# Patient Record
Sex: FEMALE | Race: WHITE | NOT HISPANIC OR LATINO | Employment: OTHER | URBAN - NONMETROPOLITAN AREA
[De-identification: names, ages, dates, MRNs, and addresses within clinical notes are randomized per-mention and may not be internally consistent; named-entity substitution may affect disease eponyms.]

---

## 2017-03-16 ENCOUNTER — TRANSFERRED RECORDS (OUTPATIENT)
Dept: HEALTH INFORMATION MANAGEMENT | Facility: HOSPITAL | Age: 37
End: 2017-03-16

## 2017-03-16 LAB
ABO + RH BLD: NORMAL
ABO + RH BLD: NORMAL
BLD GP AB SCN SERPL QL: NEGATIVE
CULTURE MICRO: NEGATIVE
HBV SURFACE AG SERPL QL IA: NEGATIVE
HCT VFR BLD AUTO: 41 %
HEMOGLOBIN: 13.6 G/DL (ref 11.7–15.7)
PLATELET # BLD AUTO: 207 10^9/L
RUBELLA ABY IGG: 18.3
RUBELLA ANTIBODY IGG QUANTITATIVE: NEGATIVE IU/ML
T PALLIDUM IGG SER QL: NON REACTIVE

## 2017-05-12 ENCOUNTER — TRANSFERRED RECORDS (OUTPATIENT)
Dept: HEALTH INFORMATION MANAGEMENT | Facility: HOSPITAL | Age: 37
End: 2017-05-12

## 2017-05-23 ENCOUNTER — TRANSFERRED RECORDS (OUTPATIENT)
Dept: HEALTH INFORMATION MANAGEMENT | Facility: HOSPITAL | Age: 37
End: 2017-05-23

## 2017-05-24 ENCOUNTER — TRANSFERRED RECORDS (OUTPATIENT)
Dept: HEALTH INFORMATION MANAGEMENT | Facility: HOSPITAL | Age: 37
End: 2017-05-24

## 2017-06-01 ENCOUNTER — VIRTUAL VISIT (OUTPATIENT)
Dept: OBGYN | Facility: OTHER | Age: 37
End: 2017-06-01

## 2017-06-01 DIAGNOSIS — Z53.9 ERRONEOUS ENCOUNTER--DISREGARD: Primary | ICD-10-CM

## 2017-06-20 ENCOUNTER — PRENATAL OFFICE VISIT (OUTPATIENT)
Dept: OBGYN | Facility: OTHER | Age: 37
End: 2017-06-20
Attending: ADVANCED PRACTICE MIDWIFE
Payer: COMMERCIAL

## 2017-06-20 VITALS
BODY MASS INDEX: 23.99 KG/M2 | HEIGHT: 65 IN | WEIGHT: 144 LBS | HEART RATE: 79 BPM | DIASTOLIC BLOOD PRESSURE: 66 MMHG | SYSTOLIC BLOOD PRESSURE: 104 MMHG | OXYGEN SATURATION: 99 %

## 2017-06-20 DIAGNOSIS — Z34.80 SUPERVISION OF OTHER NORMAL PREGNANCY, ANTEPARTUM: ICD-10-CM

## 2017-06-20 PROCEDURE — 99207 ZZC FIRST OB VISIT: CPT | Performed by: ADVANCED PRACTICE MIDWIFE

## 2017-06-20 ASSESSMENT — ANXIETY QUESTIONNAIRES
7. FEELING AFRAID AS IF SOMETHING AWFUL MIGHT HAPPEN: NOT AT ALL
IF YOU CHECKED OFF ANY PROBLEMS ON THIS QUESTIONNAIRE, HOW DIFFICULT HAVE THESE PROBLEMS MADE IT FOR YOU TO DO YOUR WORK, TAKE CARE OF THINGS AT HOME, OR GET ALONG WITH OTHER PEOPLE: NOT DIFFICULT AT ALL
2. NOT BEING ABLE TO STOP OR CONTROL WORRYING: NOT AT ALL
4. TROUBLE RELAXING: NOT AT ALL
3. WORRYING TOO MUCH ABOUT DIFFERENT THINGS: NOT AT ALL
5. BEING SO RESTLESS THAT IT IS HARD TO SIT STILL: NOT AT ALL
6. BECOMING EASILY ANNOYED OR IRRITABLE: SEVERAL DAYS
GAD7 TOTAL SCORE: 1
1. FEELING NERVOUS, ANXIOUS, OR ON EDGE: NOT AT ALL

## 2017-06-20 ASSESSMENT — PAIN SCALES - GENERAL: PAINLEVEL: NO PAIN (0)

## 2017-06-20 NOTE — MR AVS SNAPSHOT
"              After Visit Summary   6/20/2017    Diana Chavis    MRN: [id number]           Patient Information     Date Of Birth          1980        Visit Information        Provider Department      6/20/2017 10:30 AM Marcelo Roberts APRN CNM [facility]        Today's Diagnoses     Supervision of other normal pregnancy, antepartum          Care Instructions    Return in 3 weeks for prenatal care and labs.          Follow-ups after your visit        Your next 10 appointments already scheduled     Jul 10, 2017 10:30 AM CDT   (Arrive by 10:15 AM)   ESTABLISHED PRENATAL with MARYANN Francisco CNM   [facility] ([facility] )    [address]  [facility] [address]   [phone]              Who to contact     If you have questions or need follow up information about today's clinic visit or your schedule please contact [facility] directly at [phone].  Normal or non-critical lab and imaging results will be communicated to you by MyChart, letter or phone within 4 business days after the clinic has received the results. If you do not hear from us within 7 days, please contact the clinic through [employer] or phone. If you have a critical or abnormal lab result, we will notify you by phone as soon as possible.  Submit refill requests through [employer] or call your pharmacy and they will forward the refill request to us. Please allow 3 business days for your refill to be completed.          Additional Information About Your Visit        [employer] Information     [employer] lets you send messages to your doctor, view your test results, renew your prescriptions, schedule appointments and more. To sign up, go to [email or web address] . Click on \"Log in\" on the left side of the screen, which will take you to the Welcome page. Then click on \"Sign up Now\" on the right side of the page.     You will be asked to enter the access code listed below, as well as some " "personal information. Please follow the directions to create your username and password.     Your access code is: [email or web address]  Expires: 2017  4:28 PM     Your access code will  in 90 days. If you need help or a new code, please call your [facility] or [phone].        Care EveryWhere ID     This is your Care EveryWhere ID. This could be used by other organizations to access your [place] medical records  [id number]        Your Vitals Were     Pulse Height Last Period Pulse Oximetry BMI (Body Mass Index)       79 5' 5\" (1.651 m) 2016 (Exact Date) 99% 23.96 kg/m2        Blood Pressure from Last 3 Encounters:   17 104/66    Weight from Last 3 Encounters:   17 144 lb (65.3 kg)              We Performed the Following     ABO and Rh     Anti Treponema     Antibody screen red cell     CBC with platelets     CBC with platelets     Hepatitis B surface antigen     OB hemoglobin     Rubella Antibody IgG Quantitative     Urine Culture Aerobic Bacterial        Primary Care Provider    None Specified       No primary provider on file.        Thank you!     Thank you for choosing [facility]  for your care. Our goal is always to provide you with excellent care. Hearing back from our patients is one way we can continue to improve our services. Please take a few minutes to complete the written survey that you may receive in the mail after your visit with us. Thank you!             Your Updated Medication List - Protect others around you: Learn how to safely use, store and throw away your medicines at www.disposemymeds.org.          This list is accurate as of: 17  4:32 PM.  Always use your most recent med list.                   Brand Name Dispense Instructions for use    FISH OIL OMEGA-3 PO          PRENATAL VITAMINS PO      Take 1 tablet by mouth daily         "

## 2017-06-20 NOTE — PROGRESS NOTES
NEW OB VISIT  Diana Chavis is a 36 year old  at 24w4d presenting for a new ob visit.      Currently taking PNV? y  Folate y    ZIKA no    MEDICAL HISTORY:  Diabetes: No  Hypertension: No  Heart Disease: No  Autoimmune disorder: No  Kidney Disease/UTI: No  Neurologic Disease/Epilepsy:No  Psychiatric Disease: No  Depression/Postpartum Depression:No  Varicositites/Phlebitis: No  Hepatitis/Liver Disease: no  Thyroid Dysfunction: No  Trauma/Violence: No  History of Blood Transfusion: No  Tobacco Use: No  Alcohol Use: No  Illicit/Recreational Drugs: No  D (Rh Sensitized): No  Pulmonary Disease (TB/Asthma): No  Drug/Latex Allergies/Reactions: No  Breast: No  GYN Surgery:No  Operations/Hospitalizations:Yes, wisdom teeth extracted   Anesthetic Complications: No  History of Abnormal Pap:No  Uterine Anomalies/SANA: No  Infertility: No  ART Treatment: No  Relevant Family History:No  Other/Comments: No    INFECTION HISTORY:  Are you exposed to TB anywhere you work or live?: n  Do you or your Partner have Genital Herpes: n  Rash or viral illness or fever since LMP: n  Hepatitis B or C: n  History of STI (Gonorrhea, Chlamydia, HPV, HIV, Syphilis): n  Other: n  Cats n    BABY DOC unsure             Breast feeding: y        IMMUNIZATION HISTORY:  Chicken Pox: y  Flu Vaccine:  n  Pneumococcal if smoker or RAD:  n  Tdap: At 27 weeks gestation  HPV vaccinations (Gardasil): n  Other/comments: n    FAMILY HISTORY  Diabetes: n  Hypertension: dad  CVA/Stroke: n  Lupus: n  Cancers: Breast  n ovarian n,colon n,uterine: n           Genetics Screening/Teratology Counseling:  Includes Patient, Baby's Father, or anyone in either family with:  Patient's age 35 years or older as of estimated date of delivery:  y  Thalassemia: MCV less than 80: n  Neural Tube defects: n  Congenital Heart Defects: n  Down syndrome: FOB uncle  Grover-Sachs: n  Canavan Disease: n  Familial Dysautonomia: n  Sickle Cell Disease or Trait: n  Hemophilia or other  "blood disorders: n  Muscular Dystrophy: n  Cystic Fibrosis: n  Brigette's Chorea: n  Mental Retardation or Autism: Mom has 2 cousins with learning disabilities/FOB has one counsin  Other genetic or chromosomal disorders: n  Maternal Metabolic Disorder (Type 1 DM, PKU): n  Patient or baby's father with birth defects not listed above: n  Recurrent pregnancy loss or stillbirth: n  Medications (Supplements, drugs)/ Illicit/ Recreational drugs/ Alcohol since LMP: n  Other/Comments: n    Review Of Systems:   C:     NEGATIVE for fever, chills, change in weight  I:       NEGATIVE for worrisome rashes, moles or lesions  E:     NEGATIVE for vision changes or irritation  E/M: NEGATIVE for ear, mouth and throat problems  R:     NEGATIVE for significant cough or SOB  CV:   NEGATIVE for chest pain, palpitations or peripheral edema  GI:     NEGATIVE for unusual nausea, abdominal pain, heartburn, or change in bowel   :   NEGATIVE for frequency, dysuria, hematuria, vaginal discharge or bleeding  M:     NEGATIVE for significant arthralgias or myalgia  N:      NEGATIVE for weakness, dizziness or paresthesias  E:      NEGATIVE for temperature intolerance, skin/hair changes  P:      NEGATIVE for changes in mood or affect.     PHYSICAL EXAM:   /66 (BP Location: Left arm, Patient Position: Chair, Cuff Size: Adult Regular)  Pulse 79  Ht 5' 5\" (1.651 m)  Wt 144 lb (65.3 kg)  LMP 12/26/2016 (Exact Date)  SpO2 99%  BMI 23.96 kg/m2   BMI: Body mass index is 23.96 kg/(m^2).  Constitutional: healthy, alert and no distress  Head: Normocephalic. No masses, lesions, tenderness or abnormalities  Neck: Neck supple. Trachea midline. No adenopathy. Thyroid symmetric, normal size.   Cardiovascular: RRR.   Respiratory: lungs clear   Breast: Breasts reveal mild symmetric fibrocystic densities, but there are no dominant, discrete, fixed or suspicious masses found.  Gastrointestinal: 24 week gestation  Pelvic:  deferred  Rectal Exam: " deferred  Musculoskeletal: extremities normal  Skin: no suspicious lesions or rashes  Psychiatric: Affect appropriate, cooperative,mentation appears normal.     Risk assessment done. Level is   low    ASSESSMENT:     IUP at 24w 4d  Age 37 at TREVA/May be candidate for free-cell DNA  Hx of hypno-birthing    PLAN:  Prenatal labs completed  11-13 weeks 1st trimester NT/Bloodwork/declined  15-16 wk MSAFP/declined  Discussed free-cell DNA with age being 37 years at delivery  AnatomyUltrasound at 20 weeks/done  Tdap at 27 weeks  EFW at 38 weeks prn     Flu shot no  RTO return in 3 weeks    Greater than 30 were spent in face to face counseling and interview by me for this initial new ob visit.  Marcelo WOLF, ABRAHANM

## 2017-06-20 NOTE — NURSING NOTE
"Chief Complaint   Patient presents with     Prenatal Care     24w4d       Initial /66 (BP Location: Left arm, Patient Position: Chair, Cuff Size: Adult Regular)  Pulse 79  Ht 5' 5\" (1.651 m)  Wt 144 lb (65.3 kg)  LMP 12/26/2016 (Exact Date)  SpO2 99%  BMI 23.96 kg/m2 Estimated body mass index is 23.96 kg/(m^2) as calculated from the following:    Height as of this encounter: 5' 5\" (1.651 m).    Weight as of this encounter: 144 lb (65.3 kg).  Medication Reconciliation: mandy Lloyd      "

## 2017-06-21 ASSESSMENT — PATIENT HEALTH QUESTIONNAIRE - PHQ9: SUM OF ALL RESPONSES TO PHQ QUESTIONS 1-9: 4

## 2017-06-21 ASSESSMENT — ANXIETY QUESTIONNAIRES: GAD7 TOTAL SCORE: 1

## 2017-07-12 ENCOUNTER — PRENATAL OFFICE VISIT (OUTPATIENT)
Dept: OBGYN | Facility: OTHER | Age: 37
End: 2017-07-12
Attending: ADVANCED PRACTICE MIDWIFE
Payer: COMMERCIAL

## 2017-07-12 VITALS
WEIGHT: 152 LBS | BODY MASS INDEX: 26.93 KG/M2 | HEART RATE: 68 BPM | OXYGEN SATURATION: 100 % | DIASTOLIC BLOOD PRESSURE: 62 MMHG | HEIGHT: 63 IN | SYSTOLIC BLOOD PRESSURE: 100 MMHG

## 2017-07-12 DIAGNOSIS — Z34.80 SUPERVISION OF OTHER NORMAL PREGNANCY, ANTEPARTUM: ICD-10-CM

## 2017-07-12 DIAGNOSIS — Z34.82 ENCOUNTER FOR SUPERVISION OF OTHER NORMAL PREGNANCY, SECOND TRIMESTER: Primary | ICD-10-CM

## 2017-07-12 DIAGNOSIS — O99.810 GLUCOSE INTOLERANCE OF PREGNANCY: ICD-10-CM

## 2017-07-12 DIAGNOSIS — Z34.80 SUPERVISION OF OTHER NORMAL PREGNANCY, ANTEPARTUM: Primary | ICD-10-CM

## 2017-07-12 DIAGNOSIS — Z23 NEED FOR TDAP VACCINATION: ICD-10-CM

## 2017-07-12 DIAGNOSIS — O99.810 GLUCOSE INTOLERANCE OF PREGNANCY: Primary | ICD-10-CM

## 2017-07-12 LAB
ALBUMIN UR-MCNC: NEGATIVE MG/DL
APPEARANCE UR: CLEAR
BACTERIA #/AREA URNS HPF: ABNORMAL /HPF
BILIRUB UR QL STRIP: NEGATIVE
COLOR UR AUTO: YELLOW
ERYTHROCYTE [DISTWIDTH] IN BLOOD BY AUTOMATED COUNT: 13.3 % (ref 10–15)
GLUCOSE 1H P 50 G GLC PO SERPL-MCNC: 161 MG/DL (ref 60–129)
GLUCOSE UR STRIP-MCNC: NEGATIVE MG/DL
HCT VFR BLD AUTO: 34.3 % (ref 35–47)
HGB BLD-MCNC: 11.9 G/DL (ref 11.7–15.7)
HGB UR QL STRIP: NEGATIVE
KETONES UR STRIP-MCNC: NEGATIVE MG/DL
LEUKOCYTE ESTERASE UR QL STRIP: ABNORMAL
MCH RBC QN AUTO: 32.7 PG (ref 26.5–33)
MCHC RBC AUTO-ENTMCNC: 34.7 G/DL (ref 31.5–36.5)
MCV RBC AUTO: 94 FL (ref 78–100)
MUCOUS THREADS #/AREA URNS LPF: PRESENT /LPF
NITRATE UR QL: NEGATIVE
PH UR STRIP: 7 PH (ref 4.7–8)
PLATELET # BLD AUTO: 200 10E9/L (ref 150–450)
RBC # BLD AUTO: 3.64 10E12/L (ref 3.8–5.2)
RBC #/AREA URNS AUTO: 0 /HPF (ref 0–2)
SP GR UR STRIP: 1.01 (ref 1–1.03)
SQUAMOUS #/AREA URNS AUTO: 2 /HPF (ref 0–1)
URN SPEC COLLECT METH UR: ABNORMAL
UROBILINOGEN UR STRIP-MCNC: NORMAL MG/DL (ref 0–2)
WBC # BLD AUTO: 8.7 10E9/L (ref 4–11)
WBC #/AREA URNS AUTO: 1 /HPF (ref 0–2)

## 2017-07-12 PROCEDURE — 82950 GLUCOSE TEST: CPT | Performed by: ADVANCED PRACTICE MIDWIFE

## 2017-07-12 PROCEDURE — 99000 SPECIMEN HANDLING OFFICE-LAB: CPT | Performed by: ADVANCED PRACTICE MIDWIFE

## 2017-07-12 PROCEDURE — 90715 TDAP VACCINE 7 YRS/> IM: CPT | Performed by: ADVANCED PRACTICE MIDWIFE

## 2017-07-12 PROCEDURE — 85027 COMPLETE CBC AUTOMATED: CPT | Performed by: ADVANCED PRACTICE MIDWIFE

## 2017-07-12 PROCEDURE — 81001 URINALYSIS AUTO W/SCOPE: CPT | Performed by: ADVANCED PRACTICE MIDWIFE

## 2017-07-12 PROCEDURE — 86780 TREPONEMA PALLIDUM: CPT | Mod: 90 | Performed by: ADVANCED PRACTICE MIDWIFE

## 2017-07-12 PROCEDURE — 90471 IMMUNIZATION ADMIN: CPT | Performed by: ADVANCED PRACTICE MIDWIFE

## 2017-07-12 PROCEDURE — 36415 COLL VENOUS BLD VENIPUNCTURE: CPT | Performed by: ADVANCED PRACTICE MIDWIFE

## 2017-07-12 PROCEDURE — 99207 ZZC PRENATAL VISIT: CPT | Performed by: ADVANCED PRACTICE MIDWIFE

## 2017-07-12 ASSESSMENT — PAIN SCALES - GENERAL: PAINLEVEL: NO PAIN (0)

## 2017-07-12 NOTE — PROGRESS NOTES
Doing well.  Baby active.  Denies contractions, bleeding or LOF.  Discussed PTL, PIH, and birth plan, and Pitocin after delivery prophylactic for PPH.  Birth place tour given.  28 week labs today with one-hour GTT  RTO 2 weeks.

## 2017-07-12 NOTE — MR AVS SNAPSHOT
"              After Visit Summary   7/12/2017    Diana Chavis    MRN: [id number]           Patient Information     Date Of Birth          1980        Visit Information        Provider Department      7/12/2017 11:30 AM Marcelo Roberts APRN [facility]        Today's Diagnoses     Encounter for supervision of other normal pregnancy, second trimester    -  1    Supervision of other normal pregnancy, antepartum        Need for Tdap vaccination          Care Instructions    Return to office in 2 weeks for prenatal care and as needed.          Follow-ups after your visit        Who to contact     If you have questions or need follow up information about today's clinic visit or your schedule please contact [facility] directly at [phone].  Normal or non-critical lab and imaging results will be communicated to you by [employer], letter or phone within 4 business days after the clinic has received the results. If you do not hear from us within 7 days, please contact the clinic through [employer] or phone. If you have a critical or abnormal lab result, we will notify you by phone as soon as possible.  Submit refill requests through [employer] or call your pharmacy and they will forward the refill request to us. Please allow 3 business days for your refill to be completed.          Additional Information About Your Visit        MyChart Information     [employer] lets you send messages to your doctor, view your test results, renew your prescriptions, schedule appointments and more. To sign up, go to [email or web address] . Click on \"Log in\" on the left side of the screen, which will take you to the Welcome page. Then click on \"Sign up Now\" on the right side of the page.     You will be asked to enter the access code listed below, as well as some personal information. Please follow the directions to create your username and password.     Your access code is: [email or web address]  Expires: 9/11/2017  " "4:28 PM     Your access code will  in 90 days. If you need help or a new code, please call your [place] clinic or [phone].        Care EveryWhere ID     This is your Care EveryWhere ID. This could be used by other organizations to access your [place] medical records  [id number]        Your Vitals Were     Pulse Height Last Period Pulse Oximetry BMI (Body Mass Index)       68 5' 3\" (1.6 m) 2016 (Exact Date) 100% 26.93 kg/m2        Blood Pressure from Last 3 Encounters:   17 100/62   17 104/66    Weight from Last 3 Encounters:   17 152 lb (68.9 kg)   17 144 lb (65.3 kg)              We Performed the Following     TDAP VACCINE (ADACEL)        Primary Care Provider    None Specified       No primary provider on file.        Equal Access to Services     [facility]: Hadii jame Shell, kevin newberry, jayda kaalmamartina solorzano, lauren rees . So [facility] [phone].    ATENCIÓN: Si habla español, tiene a cuellar disposición servicios gratuitos de asistencia lingüística. Llame al [phone].    We comply with applicable federal civil rights laws and [place] laws. We do not discriminate on the basis of race, color, national origin, age, disability sex, sexual orientation or gender identity.            Thank you!     Thank you for choosing [facility]  for your care. Our goal is always to provide you with excellent care. Hearing back from our patients is one way we can continue to improve our services. Please take a few minutes to complete the written survey that you may receive in the mail after your visit with us. Thank you!             Your Updated Medication List - Protect others around you: Learn how to safely use, store and throw away your medicines at www.disposemymeds.org.          This list is accurate as of: 17  5:05 PM.  Always use your most recent med list.                   Brand Name Dispense Instructions for " use Diagnosis    FISH OIL OMEGA-3 PO           PRENATAL VITAMINS PO      Take 1 tablet by mouth daily

## 2017-07-12 NOTE — NURSING NOTE
"Chief Complaint   Patient presents with     Prenatal Care     27w5d       Initial /62 (BP Location: Left arm, Patient Position: Chair, Cuff Size: Adult Regular)  Pulse 68  Ht 5' 3\" (1.6 m)  Wt 152 lb (68.9 kg)  LMP 12/26/2016 (Exact Date)  SpO2 100%  BMI 26.93 kg/m2 Estimated body mass index is 26.93 kg/(m^2) as calculated from the following:    Height as of this encounter: 5' 3\" (1.6 m).    Weight as of this encounter: 152 lb (68.9 kg).  Medication Reconciliation: mandy Lloyd      "

## 2017-07-13 PROBLEM — O99.810 GLUCOSE INTOLERANCE OF PREGNANCY: Status: ACTIVE | Noted: 2017-07-13

## 2017-07-14 LAB — T PALLIDUM IGG+IGM SER QL: NEGATIVE

## 2017-07-24 ENCOUNTER — HOSPITAL ENCOUNTER (OUTPATIENT)
Dept: EDUCATION SERVICES | Facility: HOSPITAL | Age: 37
Discharge: HOME | End: 2017-07-24
Attending: ADVANCED PRACTICE MIDWIFE | Admitting: ADVANCED PRACTICE MIDWIFE
Payer: COMMERCIAL

## 2017-07-24 VITALS
HEIGHT: 63 IN | WEIGHT: 152.7 LBS | BODY MASS INDEX: 27.05 KG/M2 | SYSTOLIC BLOOD PRESSURE: 103 MMHG | HEART RATE: 66 BPM | OXYGEN SATURATION: 98 % | DIASTOLIC BLOOD PRESSURE: 62 MMHG

## 2017-07-24 DIAGNOSIS — Z34.82 ENCOUNTER FOR SUPERVISION OF OTHER NORMAL PREGNANCY, SECOND TRIMESTER: ICD-10-CM

## 2017-07-24 DIAGNOSIS — Z71.89 ACP (ADVANCE CARE PLANNING): Chronic | ICD-10-CM

## 2017-07-24 DIAGNOSIS — Z23 NEED FOR TDAP VACCINATION: ICD-10-CM

## 2017-07-24 DIAGNOSIS — O99.810 GLUCOSE INTOLERANCE OF PREGNANCY: ICD-10-CM

## 2017-07-24 DIAGNOSIS — Z34.80 SUPERVISION OF OTHER NORMAL PREGNANCY, ANTEPARTUM: ICD-10-CM

## 2017-07-24 LAB
EST. AVERAGE GLUCOSE BLD GHB EST-MCNC: 105 MG/DL
GLUCOSE 2H P 75 G GLC PO SERPL-MCNC: 100 MG/DL (ref 60–200)
GLUCOSE BLD-MCNC: 83 MG/DL (ref 70–99)
HBA1C MFR BLD: 5.3 % (ref 4.3–6)

## 2017-07-24 PROCEDURE — 82950 GLUCOSE TEST: CPT | Performed by: ADVANCED PRACTICE MIDWIFE

## 2017-07-24 PROCEDURE — 83036 HEMOGLOBIN GLYCOSYLATED A1C: CPT | Performed by: ADVANCED PRACTICE MIDWIFE

## 2017-07-24 PROCEDURE — 82947 ASSAY GLUCOSE BLOOD QUANT: CPT | Performed by: ADVANCED PRACTICE MIDWIFE

## 2017-07-24 PROCEDURE — 97802 MEDICAL NUTRITION INDIV IN: CPT | Performed by: DIETITIAN, REGISTERED

## 2017-07-24 ASSESSMENT — PAIN SCALES - GENERAL: PAINLEVEL: NO PAIN (0)

## 2017-07-24 NOTE — IP AVS SNAPSHOT
"                  MRN:[id number]                      After Visit Summary   7/24/2017    Diana Chavis    MRN: [id number]           Thank you!     Thank you for choosing [facility] for your care. Our goal is always to provide you with excellent care. Hearing back from our patients is one way we can continue to improve our services. Please take a few minutes to complete the written survey that you may receive in the mail after you visit with us. Thank you!        Patient Information     Date Of Birth          1980        About your hospital stay     You were admitted on:  July 24, 2017 You last received care in the:  [facility]    You were discharged on:  July 24, 2017       Who to Call     For medical emergencies, please call 911.  For non-urgent questions about your medical care, please call your primary care provider or clinic, None          Attending Provider     Provider Specialty    Marcelo Roberts APRN CNM [facility]       Primary Care Provider    None Specified      Further instructions from your care team       -Follow carbohydrate counting meal plan provided.    -Be as active as able/allowed.   -If two hour is elevated -  your meter and begin testing 4x/day.    -Record all glucose levels on sheet provided and bring with you to provider visits.    -Call if any three levels in one column are out of target.   -JS Andre, E [phone]    Pending Results     No orders found from 7/22/2017 to 7/25/2017.            Admission Information     Date & Time Provider Department Dept. Phone    7/24/2017 Marcelo Roberts APRN CNM [facility] [phone]      Your Vitals Were     Blood Pressure Pulse Height Weight Last Period Pulse Oximetry    103/62 (BP Location: Right arm, Patient Position: Chair, Cuff Size: Adult Large) 66 1.6 m (5' 3\") 69.3 kg (152 lb 11.2 oz) 12/26/2016 (Exact Date) 98%    BMI (Body Mass Index)                   27.05 kg/m2           MyChart Information     " "[employer] lets you send messages to your doctor, view your test results, renew your prescriptions, schedule appointments and more. To sign up, go to [email or web address] . Click on \"Log in\" on the left side of the screen, which will take you to the Welcome page. Then click on \"Sign up Now\" on the right side of the page.     You will be asked to enter the access code listed below, as well as some personal information. Please follow the directions to create your username and password.     Your access code is: [email or web address]  Expires: 2017  4:28 PM     Your access code will  in 90 days. If you need help or a new code, please call your [place] clinic or [phone].        Care EveryWhere ID     This is your Care EveryWhere ID. This could be used by other organizations to access your [place] medical records  [id number]        Equal Access to Services     [facility]: Hadkriss wayo Soestela, waaxda lusteven, qaybta kaalmada adesegundo, lauren rees . So [facility] [phone].    ATENCIÓN: Si hugh dsouza, tiene a cuellar disposición servicios gratuitos de asistencia lingüística. Llame al [phone].    We comply with applicable federal civil rights laws and [place] laws. We do not discriminate on the basis of race, color, national origin, age, disability sex, sexual orientation or gender identity.               Review of your medicines      UNREVIEWED medicines. Ask your doctor about these medicines        Dose / Directions    FISH OIL OMEGA-3 PO        Refills:  0       PRENATAL VITAMINS PO        Dose:  1 tablet   Take 1 tablet by mouth daily   Refills:  0                Protect others around you: Learn how to safely use, store and throw away your medicines at www.disposemymeds.org.             Medication List: This is a list of all your medications and when to take them. Check marks below indicate your daily home schedule. Keep this list as a reference.      Medications  "          Morning Afternoon Evening Bedtime As Needed    FISH OIL OMEGA-3 PO                                PRENATAL VITAMINS PO   Take 1 tablet by mouth daily

## 2017-07-24 NOTE — IP AVS SNAPSHOT
[facility]    [address] [place] [id number]    Phone:  [phone]    Fax:  [phone]                                       After Visit Summary   7/24/2017    Diana Chavis    MRN: [id number]           After Visit Summary Signature Page     I have received my discharge instructions, and my questions have been answered. I have discussed any challenges I see with this plan with the nurse or doctor.    ..........................................................................................................................................  Patient/Patient Representative Signature      ..........................................................................................................................................  Patient Representative Print Name and Relationship to Patient    ..................................................               ................................................  Date                                            Time    ..........................................................................................................................................  Reviewed by Signature/Title    ...................................................              ..............................................  Date                                                            Time

## 2017-07-24 NOTE — DISCHARGE INSTRUCTIONS
-Follow carbohydrate counting meal plan provided.    -Be as active as able/allowed.   -If two hour is elevated -  your meter and begin testing 4x/day.    -Record all glucose levels on sheet provided and bring with you to provider visits.    -Call if any three levels in one column are out of target.   -JS Andre, [phone]

## 2017-07-24 NOTE — PROGRESS NOTES
"Pt is here today with dx of glucose intolerance in pregnancy.  This is her 5th pregnancy.  She notes she failed her 1 hour with one of her pregnancies but passed  3 hour test.  None of her babies were greater than 9 pounds.      /62 (BP Location: Right arm, Patient Position: Chair, Cuff Size: Adult Large)  Pulse 66  Ht 1.6 m (5' 3\")  Wt 69.3 kg (152 lb 11.2 oz)  LMP 12/26/2016 (Exact Date)  SpO2 98%  BMI 27.05 kg/m2  Pt notes prepregnancy weight of 125#.      1 hour was 161.  Pt is in the process of 2 hour today.  Fasting is 83.  Waiting 2 hour.      Verbal diet recall obtained.  Pt eats three meals per day with occasional snacks between.  She has already been trying to limit carbohydrates in diet.    Breakfast- 2 eggs, 1-2 toast with butter, coffee  Snack-orange  Lunch-meat, vegetables, milk  Snack-toast or fruit or cheese stick or crackers  Supper-meat, vegis, milk  Snack-none    Pt is active caring for her five children but no routine exercise.      Educated pt on healthy diet for pregnancy, carbohydrate counting meal plan, carbohydrate limits, glycemic index/load,  portion control, label reading and benefits of activity as allowed.  Pt listened attentively and was able to grasp information easily.  Pt will not have to make many changes to current diet per diet recall.      Pt was educated on use of One Touch Verio Flex meter and will pick meter up if she does not pass two hour.  She has sheet to record glucose levels 4x/day and will bring to provider visits if indicated.  Supplies will need to be ordered from [facility] in [place].      PLAN:  Follow carbohydrate counting meal plan and begin testing if indicated by 2 hour results.      Follow up as needed.   "

## 2017-07-24 NOTE — NURSING NOTE
"Chief Complaint   Patient presents with     Diabetes     glucose intolerance of pregnancy       Initial /62 (BP Location: Right arm, Patient Position: Chair, Cuff Size: Adult Large)  Pulse 66  Ht 1.6 m (5' 3\")  Wt 69.3 kg (152 lb 11.2 oz)  LMP 12/26/2016 (Exact Date)  SpO2 98%  BMI 27.05 kg/m2 Estimated body mass index is 27.05 kg/(m^2) as calculated from the following:    Height as of this encounter: 1.6 m (5' 3\").    Weight as of this encounter: 69.3 kg (152 lb 11.2 oz).  Medication Reconciliation: complete   Tasha Taylor      "

## 2017-07-27 ENCOUNTER — PRENATAL OFFICE VISIT (OUTPATIENT)
Dept: OBGYN | Facility: OTHER | Age: 37
End: 2017-07-27
Attending: ADVANCED PRACTICE MIDWIFE
Payer: COMMERCIAL

## 2017-07-27 VITALS
HEIGHT: 63 IN | BODY MASS INDEX: 26.93 KG/M2 | HEART RATE: 81 BPM | OXYGEN SATURATION: 98 % | SYSTOLIC BLOOD PRESSURE: 104 MMHG | WEIGHT: 152 LBS | DIASTOLIC BLOOD PRESSURE: 60 MMHG

## 2017-07-27 DIAGNOSIS — Z34.80 SUPERVISION OF OTHER NORMAL PREGNANCY, ANTEPARTUM: ICD-10-CM

## 2017-07-27 DIAGNOSIS — O99.810 GLUCOSE INTOLERANCE OF PREGNANCY: ICD-10-CM

## 2017-07-27 PROCEDURE — 99207 ZZC PRENATAL VISIT: CPT | Performed by: ADVANCED PRACTICE MIDWIFE

## 2017-07-27 ASSESSMENT — PAIN SCALES - GENERAL: PAINLEVEL: NO PAIN (0)

## 2017-07-27 NOTE — MR AVS SNAPSHOT
"              After Visit Summary   2017    Diana Chavis    MRN: [id number]           Patient Information     Date Of Birth          1980        Visit Information        Provider Department      2017 10:30 AM Marcelo Roberts APRN [facility]        Today's Diagnoses     Glucose intolerance of pregnancy        Supervision of other normal pregnancy, antepartum          Care Instructions    RTO in 2 weeks for prenatal care.          Follow-ups after your visit        Who to contact     If you have questions or need follow up information about today's clinic visit or your schedule please contact [facility] directly at [phone].  Normal or non-critical lab and imaging results will be communicated to you by Wowboardhart, letter or phone within 4 business days after the clinic has received the results. If you do not hear from us within 7 days, please contact the clinic through Wowboardhart or phone. If you have a critical or abnormal lab result, we will notify you by phone as soon as possible.  Submit refill requests through Cormedics or call your pharmacy and they will forward the refill request to us. Please allow 3 business days for your refill to be completed.          Additional Information About Your Visit        MyChart Information     Cormedics lets you send messages to your doctor, view your test results, renew your prescriptions, schedule appointments and more. To sign up, go to [email or web address] . Click on \"Log in\" on the left side of the screen, which will take you to the Welcome page. Then click on \"Sign up Now\" on the right side of the page.     You will be asked to enter the access code listed below, as well as some personal information. Please follow the directions to create your username and password.     Your access code is: [email or web address]  Expires: 2017  4:28 PM     Your access code will  in 90 days. If you need help or a new code, please call your " "[facility] or [phone].        Care EveryWhere ID     This is your Care EveryWhere ID. This could be used by other organizations to access your [place] medical records  [id number]        Your Vitals Were     Pulse Height Last Period Pulse Oximetry BMI (Body Mass Index)       81 5' 3\" (1.6 m) 12/26/2016 (Exact Date) 98% 26.93 kg/m2        Blood Pressure from Last 3 Encounters:   07/27/17 104/60   07/24/17 103/62   07/12/17 100/62    Weight from Last 3 Encounters:   07/27/17 152 lb (68.9 kg)   07/24/17 152 lb 11.2 oz (69.3 kg)   07/12/17 152 lb (68.9 kg)              Today, you had the following     No orders found for display       Primary Care Provider    None       No address on file        Equal Access to Services     BHARAT MCKEON : Hadii jame Shell, waaxmartina newberry, jayda kaalshawn solorzano, lauren rees . So [facility] [phone].    ATENCIÓN: Si habla español, tiene a cuellar disposición servicios gratuitos de asistencia lingüística. Llame al [phone].    We comply with applicable federal civil rights laws and [place] laws. We do not discriminate on the basis of race, color, national origin, age, disability sex, sexual orientation or gender identity.            Thank you!     Thank you for choosing [facility]  for your care. Our goal is always to provide you with excellent care. Hearing back from our patients is one way we can continue to improve our services. Please take a few minutes to complete the written survey that you may receive in the mail after your visit with us. Thank you!             Your Updated Medication List - Protect others around you: Learn how to safely use, store and throw away your medicines at www.disposemymeds.org.          This list is accurate as of: 7/27/17 11:25 AM.  Always use your most recent med list.                   Brand Name Dispense Instructions for use Diagnosis    FISH OIL OMEGA-3 PO           PRENATAL " VITAMINS PO      Take 1 tablet by mouth daily

## 2017-07-27 NOTE — NURSING NOTE
"Chief Complaint   Patient presents with     Prenatal Care     29w6d       Initial /60 (BP Location: Right arm, Patient Position: Chair, Cuff Size: Adult Regular)  Pulse 81  Ht 5' 3\" (1.6 m)  Wt 152 lb (68.9 kg)  LMP 12/26/2016 (Exact Date)  SpO2 98%  BMI 26.93 kg/m2 Estimated body mass index is 26.93 kg/(m^2) as calculated from the following:    Height as of this encounter: 5' 3\" (1.6 m).    Weight as of this encounter: 152 lb (68.9 kg).  Medication Reconciliation: mandy Lloyd      "

## 2017-08-10 ENCOUNTER — PRENATAL OFFICE VISIT (OUTPATIENT)
Dept: OBGYN | Facility: OTHER | Age: 37
End: 2017-08-10
Attending: ADVANCED PRACTICE MIDWIFE
Payer: COMMERCIAL

## 2017-08-10 VITALS
HEIGHT: 63 IN | HEART RATE: 90 BPM | DIASTOLIC BLOOD PRESSURE: 60 MMHG | OXYGEN SATURATION: 99 % | SYSTOLIC BLOOD PRESSURE: 102 MMHG | BODY MASS INDEX: 28 KG/M2 | WEIGHT: 158 LBS

## 2017-08-10 DIAGNOSIS — O99.810 GLUCOSE INTOLERANCE OF PREGNANCY: ICD-10-CM

## 2017-08-10 DIAGNOSIS — Z34.83 ENCOUNTER FOR SUPERVISION OF OTHER NORMAL PREGNANCY, THIRD TRIMESTER: Primary | ICD-10-CM

## 2017-08-10 PROCEDURE — 99207 ZZC PRENATAL VISIT: CPT | Performed by: ADVANCED PRACTICE MIDWIFE

## 2017-08-10 ASSESSMENT — PAIN SCALES - GENERAL: PAINLEVEL: NO PAIN (0)

## 2017-08-10 NOTE — PROGRESS NOTES
Doing well.   Baby active.  Denies bleeding or LOF.  Some occasional contractions.  Discussed PTL, PIH, kick counts.    RTO in 2 weeks    MARYANN Tsang, ABRAHANM

## 2017-08-10 NOTE — MR AVS SNAPSHOT
"              After Visit Summary   8/10/2017    Diana Chavis    MRN: [id number]           Patient Information     Date Of Birth          1980        Visit Information        Provider Department      8/10/2017 1:30 PM Marcelo Roberts APRN [facility]        Today's Diagnoses     Encounter for supervision of other normal pregnancy, third trimester    -  1    Glucose intolerance of pregnancy          Care Instructions    Return in 2 weeks for prenatal care and as needed.          Follow-ups after your visit        Your next 10 appointments already scheduled     Aug 24, 2017  9:30 AM CDT   (Arrive by 9:15 AM)   ESTABLISHED PRENATAL with MARYANN Francisco CNM   [facility] ([facility] )    [address] [address] [phone]              Who to contact     If you have questions or need follow up information about today's clinic visit or your schedule please contact [facility] directly at [phone].  Normal or non-critical lab and imaging results will be communicated to you by Warplyhart, letter or phone within 4 business days after the clinic has received the results. If you do not hear from us within 7 days, please contact the clinic through Floopt or phone. If you have a critical or abnormal lab result, we will notify you by phone as soon as possible.  Submit refill requests through Beyond Gaming or call your pharmacy and they will forward the refill request to us. Please allow 3 business days for your refill to be completed.          Additional Information About Your Visit        Warplyhart Information     Beyond Gaming lets you send messages to your doctor, view your test results, renew your prescriptions, schedule appointments and more. To sign up, go to [email or web address] . Click on \"Log in\" on the left side of the screen, which will take you to the Welcome page. Then click on \"Sign up Now\" on the right side " "of the page.     You will be asked to enter the access code listed below, as well as some personal information. Please follow the directions to create your username and password.     Your access code is: [email or web address]  Expires: 2017  4:28 PM     Your access code will  in 90 days. If you need help or a new code, please call your [place] clinic or [phone].        Care EveryWhere ID     This is your Care EveryWhere ID. This could be used by other organizations to access your [place] medical records  [id number]        Your Vitals Were     Pulse Height Last Period Pulse Oximetry BMI (Body Mass Index)       90 5' 3\" (1.6 m) 2016 (Exact Date) 99% 27.99 kg/m2        Blood Pressure from Last 3 Encounters:   08/10/17 102/60   17 104/60   17 103/62    Weight from Last 3 Encounters:   08/10/17 158 lb (71.7 kg)   17 152 lb (68.9 kg)   17 152 lb 11.2 oz (69.3 kg)              Today, you had the following     No orders found for display       Primary Care Provider    None       No address on file        Equal Access to Services     MARTÍN MCKEON : Hadii jame wayo Soestela, waaxda luqadaha, qaybta kaalmada adeegyada, lauren rees . So [facility] [phone].    ATENCIÓN: Si habla español, tiene a cuellar disposición servicios gratuitos de asistencia lingüística. Llame al [phone].    We comply with applicable federal civil rights laws and [place] laws. We do not discriminate on the basis of race, color, national origin, age, disability sex, sexual orientation or gender identity.            Thank you!     Thank you for choosing [facility]  for your care. Our goal is always to provide you with excellent care. Hearing back from our patients is one way we can continue to improve our services. Please take a few minutes to complete the written survey that you may receive in the mail after your visit with us. Thank you!             Your Updated " Medication List - Protect others around you: Learn how to safely use, store and throw away your medicines at www.disposemymeds.org.          This list is accurate as of: 8/10/17  3:55 PM.  Always use your most recent med list.                   Brand Name Dispense Instructions for use Diagnosis    FISH OIL OMEGA-3 PO           PRENATAL VITAMINS PO      Take 1 tablet by mouth daily

## 2017-08-10 NOTE — NURSING NOTE
"Chief Complaint   Patient presents with     Prenatal Care     31w6d       Initial /60 (BP Location: Left arm, Patient Position: Chair, Cuff Size: Adult Regular)  Pulse 90  Ht 5' 3\" (1.6 m)  Wt 158 lb (71.7 kg)  LMP 12/26/2016 (Exact Date)  SpO2 99%  BMI 27.99 kg/m2 Estimated body mass index is 27.99 kg/(m^2) as calculated from the following:    Height as of this encounter: 5' 3\" (1.6 m).    Weight as of this encounter: 158 lb (71.7 kg).  Medication Reconciliation: mandy Lloyd      "

## 2017-08-24 ENCOUNTER — PRENATAL OFFICE VISIT (OUTPATIENT)
Dept: OBGYN | Facility: OTHER | Age: 37
End: 2017-08-24
Attending: ADVANCED PRACTICE MIDWIFE
Payer: COMMERCIAL

## 2017-08-24 ENCOUNTER — TELEPHONE (OUTPATIENT)
Dept: EDUCATION SERVICES | Facility: HOSPITAL | Age: 37
End: 2017-08-24

## 2017-08-24 VITALS
DIASTOLIC BLOOD PRESSURE: 65 MMHG | WEIGHT: 161 LBS | BODY MASS INDEX: 28.53 KG/M2 | HEART RATE: 77 BPM | SYSTOLIC BLOOD PRESSURE: 110 MMHG | HEIGHT: 63 IN | OXYGEN SATURATION: 99 %

## 2017-08-24 DIAGNOSIS — Z34.80 SUPERVISION OF OTHER NORMAL PREGNANCY, ANTEPARTUM: Primary | ICD-10-CM

## 2017-08-24 DIAGNOSIS — O24.410 DIET CONTROLLED GESTATIONAL DIABETES MELLITUS (GDM) IN THIRD TRIMESTER: ICD-10-CM

## 2017-08-24 DIAGNOSIS — R82.90 ABNORMAL URINE FINDINGS: ICD-10-CM

## 2017-08-24 DIAGNOSIS — R30.0 DYSURIA: ICD-10-CM

## 2017-08-24 DIAGNOSIS — O24.410 DIET CONTROLLED GESTATIONAL DIABETES MELLITUS (GDM), ANTEPARTUM: Primary | ICD-10-CM

## 2017-08-24 DIAGNOSIS — Z34.80 SUPERVISION OF OTHER NORMAL PREGNANCY, ANTEPARTUM: ICD-10-CM

## 2017-08-24 DIAGNOSIS — O99.810 GLUCOSE INTOLERANCE OF PREGNANCY: ICD-10-CM

## 2017-08-24 LAB
ALBUMIN UR-MCNC: NEGATIVE MG/DL
APPEARANCE UR: ABNORMAL
BACTERIA #/AREA URNS HPF: ABNORMAL /HPF
BILIRUB UR QL STRIP: NEGATIVE
COLOR UR AUTO: YELLOW
GLUCOSE BLD-MCNC: 93 MG/DL (ref 70–99)
GLUCOSE UR STRIP-MCNC: NEGATIVE MG/DL
HGB UR QL STRIP: NEGATIVE
KETONES UR STRIP-MCNC: NEGATIVE MG/DL
LEUKOCYTE ESTERASE UR QL STRIP: ABNORMAL
NITRATE UR QL: NEGATIVE
NON-SQ EPI CELLS #/AREA URNS LPF: ABNORMAL /LPF
PH UR STRIP: 6.5 PH (ref 5–7)
RBC #/AREA URNS AUTO: ABNORMAL /HPF
SOURCE: ABNORMAL
SP GR UR STRIP: 1.02 (ref 1–1.03)
UROBILINOGEN UR STRIP-ACNC: 0.2 EU/DL (ref 0.2–1)
WBC #/AREA URNS AUTO: ABNORMAL /HPF

## 2017-08-24 PROCEDURE — 87086 URINE CULTURE/COLONY COUNT: CPT | Performed by: ADVANCED PRACTICE MIDWIFE

## 2017-08-24 PROCEDURE — 81001 URINALYSIS AUTO W/SCOPE: CPT | Performed by: ADVANCED PRACTICE MIDWIFE

## 2017-08-24 PROCEDURE — 82947 ASSAY GLUCOSE BLOOD QUANT: CPT | Performed by: ADVANCED PRACTICE MIDWIFE

## 2017-08-24 PROCEDURE — 99207 ZZC COMPLICATED OB VISIT: CPT | Performed by: ADVANCED PRACTICE MIDWIFE

## 2017-08-24 RX ORDER — BLOOD-GLUCOSE METER
1 EACH MISCELLANEOUS 4 TIMES DAILY
Qty: 1 KIT | Refills: 0 | Status: SHIPPED | OUTPATIENT
Start: 2017-08-24 | End: 2017-11-20

## 2017-08-24 RX ORDER — LANCETS 33 GAUGE
1 EACH MISCELLANEOUS 4 TIMES DAILY
Qty: 100 EACH | Refills: 4 | Status: SHIPPED | OUTPATIENT
Start: 2017-08-24 | End: 2017-11-20

## 2017-08-24 RX ORDER — CEPHALEXIN 500 MG/1
500 CAPSULE ORAL 4 TIMES DAILY
Qty: 21 CAPSULE | Refills: 0 | Status: SHIPPED | OUTPATIENT
Start: 2017-08-24 | End: 2017-09-11

## 2017-08-24 ASSESSMENT — PAIN SCALES - GENERAL: PAINLEVEL: NO PAIN (0)

## 2017-08-24 NOTE — PATIENT INSTRUCTIONS
Thank you for allowing Marcelo WOLF CNM and our OB team to participate in your care.  If you have a scheduling or an appointment question please contact [facility] Unit Coordinator at their direct line [phone].   ALL nursing questions or concerns can be directed to your OB nurse at: [phone] - Sary    Return to office in one week for prenatal care and as needed.

## 2017-08-24 NOTE — MR AVS SNAPSHOT
After Visit Summary   8/24/2017    Diana Chavis    MRN: [id number]           Patient Information     Date Of Birth          1980        Visit Information        Provider Department      8/24/2017 9:30 AM Marcelo Roberts APRN CN[facility] [place]        Today's Diagnoses     Supervision of other normal pregnancy, antepartum    -  1    Glucose intolerance of pregnancy        Dysuria        Abnormal urine findings        Diet controlled gestational diabetes mellitus (GDM) in third trimester          Care Instructions    Thank you for allowing Marcelo WOLF CNM and our OB team to participate in your care.  If you have a scheduling or an appointment question please contact [facility] Unit Coordinator at their direct line [phone].   ALL nursing questions or concerns can be directed to your OB nurse at: [phone] - leah    Return to office in one week for prenatal care and as needed.          Follow-ups after your visit        Additional Services     DIABETES EDUCATION REFERRAL ([place])       DIABETES SELF-MANAGEMENT TRAINING (DSMT)  Type of training and number of hours requested:  Initial Group DMST; 10    (Medicare will cover:10 hours initial DSMT in 12-month period, plus 2 hours follow-up DSMT annually)    Please add if the patient has special educational need: None  (Patients with special needs requiring individual DSMT)    Please include the following DMST content: Preconception/pregnancy management or gestational diabetes management                         Patient has the following:Pregnancy    Please begin Medical Nutritional Therapy.  Initial Medical Nutritional Therapy (MNT)  ([place] allows 3 hours initial MNT in the first calendar year, plus two hours follow up MNT annually.  Additional MNT hours are available for change in medical condition treatment and/or diagnosis)    Additional Services Provided:  >>A1c will be completed upon referral and completion of  program unless completed in clinic.  >>Influenza vaccination assessment (form #N228) as applicable.  >>Order for diabetes supplies will be faxed to patient's pharmacy.  >>If on insulin: Insulin dose adjustment per staged Diabetes Mgmt. Protocols    DIABETES RESOURCE CENTER  [facility]  Telephone:  [phone]   Fax:  [phone]                  Your next 10 appointments already scheduled     Sep 01, 2017  2:00 PM CDT   Radiology with [facility] ULTRASOUND 2   [facility] Ultrasound ([facility] )    [address] [place] [address]   [phone]            Sep 05, 2017 10:30 AM CDT   (Arrive by 10:15 AM)   ESTABLISHED PRENATAL with MARYANN Francisco [facility] ([facility] [place] )    [address] [place] [id number] [phone]            Sep 07, 2017  2:00 PM CDT   Radiology with HI ULTRASOUND 2   [facility] Ultrasound ([facility] )    [address] [place] [address]   [phone]            Sep 14, 2017 12:00 PM CDT   Radiology with HI ULTRASOUND 2   [facility] ([facility] )    [address] [place] [address]   [phone]            Sep 21, 2017  2:00 PM CDT   Radiology with HI ULTRASOUND 2   [facility] ([facility] )    [address] [place] [address]   [phone]            Sep 28, 2017  1:00 PM CDT   Radiology with HI ULTRASOUND 2   [facility]tras[facility] ([facility] )    [address] [place] [address]   [phone]              Future tests that were ordered for you today     Open Standing Orders        Priority Remaining Interval Expires Ordered    US OB Biophys Single Gestation Measure Routine 3/9 Weekly 8/24/2018 8/24/2017    FETAL NON-STRESS TEST Routine 9/9 11/30/2017 8/24/2017          Open Future Orders        Priority Expected Expires Ordered    Hemoglobin A1c Routine  8/24/2018 8/24/2017            Who to contact     If you have  "questions or need follow up information about today's clinic visit or your schedule please contact [facility] directly at [phone].  Normal or non-critical lab and imaging results will be communicated to you by MyChart, letter or phone within 4 business days after the clinic has received the results. If you do not hear from us within 7 days, please contact the clinic through MyFrontStepshart or phone. If you have a critical or abnormal lab result, we will notify you by phone as soon as possible.  Submit refill requests through DynaPump or call your pharmacy and they will forward the refill request to us. Please allow 3 business days for your refill to be completed.          Additional Information About Your Visit        MyFrontStepsharMel[employer]via Inc Information     DynaPump lets you send messages to your doctor, view your test results, renew your prescriptions, schedule appointments and more. To sign up, go to [email or web address] . Click on \"Log in\" on the left side of the screen, which will take you to the Welcome page. Then click on \"Sign up Now\" on the right side of the page.     You will be asked to enter the access code listed below, as well as some personal information. Please follow the directions to create your username and password.     Your access code is: [email or web address]  Expires: 2017  4:28 PM     Your access code will  in 90 days. If you need help or a new code, please call your [place] clinic or [phone].        Care EveryWhere ID     This is your Care EveryWhere ID. This could be used by other organizations to access your [place] medical records  [id number]        Your Vitals Were     Pulse Height Last Period Pulse Oximetry BMI (Body Mass Index)       77 5' 3\" (1.6 m) 2016 (Exact Date) 99% 28.52 kg/m2        Blood Pressure from Last 3 Encounters:   17 110/65   08/10/17 102/60   17 104/60    Weight from Last 3 Encounters:   17 161 lb (73 kg)   08/10/17 158 lb (71.7 kg) "   07/27/17 152 lb (68.9 kg)              We Performed the Following     *UA reflex to Microscopic and Culture - [facility]     DIABETES EDUCATION REFERRAL ([place])     Glucose whole blood     Urine Culture Aerobic Bacterial     Urine Microscopic          Today's Medication Changes          These changes are accurate as of: 8/24/17  3:58 PM.  If you have any questions, ask your nurse or doctor.               Start taking these medicines.        Dose/Directions    blood glucose monitoring test strip   Commonly known as:  ONE TOUCH VERIO IQ   Used for:  Diet controlled gestational diabetes mellitus (GDM), antepartum   Started by:  Janelle Willoughby RN        Use to test blood sugar 4 times daily.   Quantity:  200 each   Refills:  5       cephALEXin 500 MG capsule   Commonly known as:  KEFLEX   Used for:  Dysuria, Supervision of other normal pregnancy, antepartum   Started by:  Marcelo Roberts APRN CNM        Dose:  500 mg   Take 1 capsule (500 mg) by mouth 4 times daily   Quantity:  21 capsule   Refills:  0       ONETOUCH DELICA LANCETS 33G Misc   Used for:  Diet controlled gestational diabetes mellitus (GDM), antepartum   Started by:  Janelle Willoughby RN        Dose:  1 each   1 each 4 times daily   Quantity:  100 each   Refills:  4       ONETOUCH VERIO FLEX SYSTEM W/DEVICE Kit   Used for:  Diet controlled gestational diabetes mellitus (GDM), antepartum   Started by:  Janelle Willoughby RN        Dose:  1 kit   1 kit 4 times daily   Quantity:  1 kit   Refills:  0            Where to get your medicines      These medications were sent to "[id number] [place] [address] AT [facility] [address]  [address], [place] [address]     Phone:  [phone]     blood glucose monitoring test strip    cephALEXin 500 MG capsule    ONETOUCH DELICA LANCETS 33G Misc    ONETOUCH VERIO FLEX SYSTEM W/DEVICE Kit                Primary Care Provider    None       No address on file        Equal Access  to Services     BHARAT MCKEON : Hadii aad ku hadalbinocan Shell, wabyronda luqadaha, qaybta kaalmamartina solorzano, lauren barcenas. So [facility] [phone].    ATENCIÓN: Si habla español, tiene a cuellar disposición servicios gratuitos de asistencia lingüística. Llame al [phone].    We comply with applicable federal civil rights laws and [place] laws. We do not discriminate on the basis of race, color, national origin, age, disability sex, sexual orientation or gender identity.            Thank you!     Thank you for choosing [facility]  for your care. Our goal is always to provide you with excellent care. Hearing back from our patients is one way we can continue to improve our services. Please take a few minutes to complete the written survey that you may receive in the mail after your visit with us. Thank you!             Your Updated Medication List - Protect others around you: Learn how to safely use, store and throw away your medicines at www.disposemymeds.org.          This list is accurate as of: 8/24/17  3:58 PM.  Always use your most recent med list.                   Brand Name Dispense Instructions for use Diagnosis    blood glucose monitoring test strip    ONE TOUCH VERIO IQ    200 each    Use to test blood sugar 4 times daily.    Diet controlled gestational diabetes mellitus (GDM), antepartum       cephALEXin 500 MG capsule    KEFLEX    21 capsule    Take 1 capsule (500 mg) by mouth 4 times daily    Dysuria, Supervision of other normal pregnancy, antepartum       FISH OIL OMEGA-3 PO           ONETOUCH DELICA LANCETS 33G Misc     100 each    1 each 4 times daily    Diet controlled gestational diabetes mellitus (GDM), antepartum       ONETOUCH VERIO FLEX SYSTEM W/DEVICE Kit     1 kit    1 kit 4 times daily    Diet controlled gestational diabetes mellitus (GDM), antepartum       PRENATAL VITAMINS PO      Take 1 tablet by mouth daily

## 2017-08-24 NOTE — NURSING NOTE
"Chief Complaint   Patient presents with     Prenatal Care       Initial /65  Pulse 77  Ht 5' 3\" (1.6 m)  Wt 161 lb (73 kg)  LMP 12/26/2016 (Exact Date)  SpO2 99%  BMI 28.52 kg/m2 Estimated body mass index is 28.52 kg/(m^2) as calculated from the following:    Height as of this encounter: 5' 3\" (1.6 m).    Weight as of this encounter: 161 lb (73 kg).  Medication Reconciliation: complete     Edwige Cheng      "

## 2017-08-24 NOTE — TELEPHONE ENCOUNTER
Diana called to say that she is at [facility] looking for meter supplies. Diana is to start testing BG readings 4 times daily. She has been instructed to call Marcelo Roberts's nurse if 2 or more FBG >90. She has already been instructed on both meal plan and BG testing. Prescriptions for Meter, test strips and lancets sent to [facility].

## 2017-08-24 NOTE — PROGRESS NOTES
Doing well.  Baby active.  Denies bleeding or LOF.  Occasional contractions and pressure.  Flunked 2 hour GTT:   Start GDM diet   Diabetic referral   Weekly appointments   Kick counts   EFW at 38 weeks    Pt will be out of town 8/25/17-8/30/17.  Pt will notify provider's nurse if FBG > 90 for 2 days.      Reports change of color in urine.    UA with micro reflex to culture  Keflex 500 mg PO Take 2 stat then one every 6 hours for 5 days.    MARYANN Tsang, CNM

## 2017-08-26 LAB
BACTERIA SPEC CULT: ABNORMAL
SPECIMEN SOURCE: ABNORMAL

## 2017-09-05 ENCOUNTER — PRENATAL OFFICE VISIT (OUTPATIENT)
Dept: OBGYN | Facility: OTHER | Age: 37
End: 2017-09-05
Attending: ADVANCED PRACTICE MIDWIFE
Payer: COMMERCIAL

## 2017-09-05 VITALS
HEART RATE: 72 BPM | BODY MASS INDEX: 28.17 KG/M2 | SYSTOLIC BLOOD PRESSURE: 110 MMHG | WEIGHT: 159 LBS | DIASTOLIC BLOOD PRESSURE: 68 MMHG | HEIGHT: 63 IN

## 2017-09-05 DIAGNOSIS — Z34.80 SUPERVISION OF OTHER NORMAL PREGNANCY, ANTEPARTUM: ICD-10-CM

## 2017-09-05 DIAGNOSIS — R30.0 DYSURIA: ICD-10-CM

## 2017-09-05 DIAGNOSIS — O99.810 ABNORMAL MATERNAL GLUCOSE TOLERANCE, ANTEPARTUM: ICD-10-CM

## 2017-09-05 DIAGNOSIS — Z34.83 ENCOUNTER FOR SUPERVISION OF OTHER NORMAL PREGNANCY IN THIRD TRIMESTER: Primary | ICD-10-CM

## 2017-09-05 LAB

## 2017-09-05 PROCEDURE — 76815 OB US LIMITED FETUS(S): CPT | Performed by: ADVANCED PRACTICE MIDWIFE

## 2017-09-05 PROCEDURE — 81001 URINALYSIS AUTO W/SCOPE: CPT | Performed by: ADVANCED PRACTICE MIDWIFE

## 2017-09-05 PROCEDURE — 87653 STREP B DNA AMP PROBE: CPT | Performed by: ADVANCED PRACTICE MIDWIFE

## 2017-09-05 PROCEDURE — 99207 ZZC PRENATAL VISIT: CPT | Mod: 25 | Performed by: ADVANCED PRACTICE MIDWIFE

## 2017-09-05 ASSESSMENT — PAIN SCALES - GENERAL: PAINLEVEL: NO PAIN (0)

## 2017-09-05 NOTE — MR AVS SNAPSHOT
After Visit Summary   9/5/2017    Diana Chavis    MRN: [id number]           Patient Information     Date Of Birth          1980        Visit Information        Provider Department      9/5/2017 10:30 AM Marcelo Roberts APRN CNM [facility] [place]        Today's Diagnoses     Encounter for supervision of other normal pregnancy in third trimester    -  1    Abnormal maternal glucose tolerance, antepartum        Supervision of other normal pregnancy, antepartum        Dysuria          Care Instructions    Return to office next week for prenatal care and as needed.    Thank you for allowing Marcelo WOLF CNM and our OB team to participate in your care.  If you have a scheduling or an appointment question please contact Berna [facility] Health Unit Coordinator at their direct line [phone].   ALL nursing questions or concerns can be directed to your OB nurse at: [phone] - Sary            Follow-ups after your visit        Your next 10 appointments already scheduled     Sep 11, 2017  2:00 PM CDT   (Arrive by 1:45 PM)   ESTABLISHED PRENATAL with MARYANN Francisco CNM   [facility] [place] ([facility] [place] )    [address]  [place] [address]   [phone]            Sep 18, 2017  2:00 PM CDT   (Arrive by 1:45 PM)   ESTABLISHED PRENATAL with MARYANN Francisco CNM   [facility] [place] ([facility] [place] )    [address]  [place] [address]   [phone]            Sep 25, 2017  2:00 PM CDT   (Arrive by 1:45 PM)   ESTABLISHED PRENATAL with MARYANN Francisco CNM   [facility] [place] ([facility] [place] )    [address]  [place] [address]   [phone]            Oct 02, 2017  2:00 PM CDT   (Arrive by 1:45 PM)   ESTABLISHED PRENATAL with MARYANN Francisco CNM   [facility] [place] ([facility] [place] )    [address]  [place] [address]   [phone]              Who to contact     If  "you have questions or need follow up information about today's clinic visit or your schedule please contact [facility] directly at [phone].  Normal or non-critical lab and imaging results will be communicated to you by MyChart, letter or phone within 4 business days after the clinic has received the results. If you do not hear from us within 7 days, please contact the clinic through At The [employer] or phone. If you have a critical or abnormal lab result, we will notify you by phone as soon as possible.  Submit refill requests through [employer] or call your pharmacy and they will forward the refill request to us. Please allow 3 business days for your refill to be completed.          Additional Information About Your Visit        At The PoolharAnagran Information     [employer]edom lets you send messages to your doctor, view your test results, renew your prescriptions, schedule appointments and more. To sign up, go to [email or web address] . Click on \"Log in\" on the left side of the screen, which will take you to the Welcome page. Then click on \"Sign up Now\" on the right side of the page.     You will be asked to enter the access code listed below, as well as some personal information. Please follow the directions to create your username and password.     Your access code is: [email or web address]  Expires: 2017  4:28 PM     Your access code will  in 90 days. If you need help or a new code, please call your [place] clinic or [phone].        Care EveryWhere ID     This is your Care EveryWhere ID. This could be used by other organizations to access your [place] medical records  [id number]        Your Vitals Were     Pulse Height Last Period BMI (Body Mass Index)          72 5' 3\" (1.6 m) 2016 (Exact Date) 28.17 kg/m2         Blood Pressure from Last 3 Encounters:   17 110/68   17 110/65   08/10/17 102/60    Weight from Last 3 Encounters:   17 159 lb (72.1 kg)   17 161 lb (73 kg)   08/10/17 " 158 lb (71.7 kg)              We Performed the Following     Beta strep group B r/o culture     UA with Microscopic reflex to Culture - [facility]     US OB LIMITED, 1 OR MORE FETUSES        Primary Care Provider    None       No address on file        Equal Access to Services     DOYLEMARTÍN LINDA : Hadkriss jame shannon paco Soestela, waaxda luqadaha, qaybta kaalmada adeegyada, waxlara sterling negrita barcenas. So [facility] [phone].    ATENCIÓN: Si habla español, tiene a cuellar disposición servicios gratuitos de asistencia lingüística. Llame al [phone].    We comply with applicable federal civil rights laws and [place] laws. We do not discriminate on the basis of race, color, national origin, age, disability sex, sexual orientation or gender identity.            Thank you!     Thank you for choosing [facility]  for your care. Our goal is always to provide you with excellent care. Hearing back from our patients is one way we can continue to improve our services. Please take a few minutes to complete the written survey that you may receive in the mail after your visit with us. Thank you!             Your Updated Medication List - Protect others around you: Learn how to safely use, store and throw away your medicines at www.disposemymeds.org.          This list is accurate as of: 9/5/17  1:54 PM.  Always use your most recent med list.                   Brand Name Dispense Instructions for use Diagnosis    blood glucose monitoring test strip    ONE TOUCH VERIO IQ    200 each    Use to test blood sugar 4 times daily.    Diet controlled gestational diabetes mellitus (GDM), antepartum       cephALEXin 500 MG capsule    KEFLEX    21 capsule    Take 1 capsule (500 mg) by mouth 4 times daily    Dysuria, Supervision of other normal pregnancy, antepartum       FISH OIL OMEGA-3 PO           ONETOUCH DELICA LANCETS 33G Misc     100 each    1 each 4 times daily    Diet controlled gestational diabetes  mellitus (GDM), antepartum       ONETOUCH VERIO FLEX SYSTEM W/DEVICE Kit     1 kit    1 kit 4 times daily    Diet controlled gestational diabetes mellitus (GDM), antepartum       PRENATAL VITAMINS PO      Take 1 tablet by mouth daily

## 2017-09-05 NOTE — NURSING NOTE
"Chief Complaint   Patient presents with     Prenatal Care       Initial /68  Pulse 72  Ht 5' 3\" (1.6 m)  Wt 159 lb (72.1 kg)  LMP 12/26/2016 (Exact Date)  BMI 28.17 kg/m2 Estimated body mass index is 28.17 kg/(m^2) as calculated from the following:    Height as of this encounter: 5' 3\" (1.6 m).    Weight as of this encounter: 159 lb (72.1 kg).  Medication Reconciliation: complete     Edwige Cheng      "

## 2017-09-05 NOTE — PROGRESS NOTES
Doing well.  Baby active.  Denies bleeding or LOF.  Irregular contractions daily.  Glucose diet log reviewed and faxed to Dr. Dowling  GBS PCR today  UA ANDREA  Bedside US:  Cephalic.  Amniotic single deepest pocket > 2 cm  Discussed diet, labor, ROM, birth plan, active management of 3rd stage, delayed cord clamping.  Pt desires No IV.  OK with Pitocin IM post delivery.    RTO in one week for prenatal care as needed.

## 2017-09-05 NOTE — PATIENT INSTRUCTIONS
Return to office next week for prenatal care and as needed.    Thank you for allowing Marcelo WOLF CNM and our OB team to participate in your care.  If you have a scheduling or an appointment question please contact Berna [facility] Health Unit Coordinator at their direct line [phone].   ALL nursing questions or concerns can be directed to your OB nurse at: [phone] - Sary

## 2017-09-06 LAB
BACTERIA SPEC CULT: NORMAL
BACTERIA SPEC CULT: NORMAL
GP B STREP DNA SPEC QL NAA+PROBE: NEGATIVE
SPECIMEN SOURCE: NORMAL
SPECIMEN SOURCE: NORMAL

## 2017-09-11 ENCOUNTER — PRENATAL OFFICE VISIT (OUTPATIENT)
Dept: OBGYN | Facility: OTHER | Age: 37
End: 2017-09-11
Attending: ADVANCED PRACTICE MIDWIFE
Payer: COMMERCIAL

## 2017-09-11 VITALS
HEIGHT: 63 IN | BODY MASS INDEX: 28.35 KG/M2 | SYSTOLIC BLOOD PRESSURE: 105 MMHG | WEIGHT: 160 LBS | DIASTOLIC BLOOD PRESSURE: 60 MMHG | HEART RATE: 73 BPM

## 2017-09-11 DIAGNOSIS — Z34.83 ENCOUNTER FOR SUPERVISION OF OTHER NORMAL PREGNANCY, THIRD TRIMESTER: ICD-10-CM

## 2017-09-11 DIAGNOSIS — O99.810 ABNORMAL MATERNAL GLUCOSE TOLERANCE, ANTEPARTUM: ICD-10-CM

## 2017-09-11 PROCEDURE — 99207 ZZC PRENATAL VISIT: CPT | Performed by: ADVANCED PRACTICE MIDWIFE

## 2017-09-11 ASSESSMENT — PATIENT HEALTH QUESTIONNAIRE - PHQ9
5. POOR APPETITE OR OVEREATING: NOT AT ALL
SUM OF ALL RESPONSES TO PHQ QUESTIONS 1-9: 6

## 2017-09-11 ASSESSMENT — ANXIETY QUESTIONNAIRES
2. NOT BEING ABLE TO STOP OR CONTROL WORRYING: NOT AT ALL
6. BECOMING EASILY ANNOYED OR IRRITABLE: SEVERAL DAYS
3. WORRYING TOO MUCH ABOUT DIFFERENT THINGS: NOT AT ALL
1. FEELING NERVOUS, ANXIOUS, OR ON EDGE: NOT AT ALL
GAD7 TOTAL SCORE: 2
7. FEELING AFRAID AS IF SOMETHING AWFUL MIGHT HAPPEN: SEVERAL DAYS
5. BEING SO RESTLESS THAT IT IS HARD TO SIT STILL: NOT AT ALL

## 2017-09-11 ASSESSMENT — PAIN SCALES - GENERAL: PAINLEVEL: NO PAIN (0)

## 2017-09-11 NOTE — NURSING NOTE
"Chief Complaint   Patient presents with     Prenatal Care       Initial /60  Pulse 73  Ht 5' 3\" (1.6 m)  Wt 160 lb (72.6 kg)  LMP 12/26/2016 (Exact Date)  BMI 28.34 kg/m2 Estimated body mass index is 28.34 kg/(m^2) as calculated from the following:    Height as of this encounter: 5' 3\" (1.6 m).    Weight as of this encounter: 160 lb (72.6 kg).  Medication Reconciliation: complete     Edwige Cheng      "

## 2017-09-11 NOTE — MR AVS SNAPSHOT
After Visit Summary   9/11/2017    Diana Chavis    MRN: [id number]           Patient Information     Date Of Birth          1980        Visit Information        Provider Department      9/11/2017 2:00 PM Marcelo Roberts APRN CNM [facility] [place]        Today's Diagnoses     Abnormal maternal glucose tolerance, antepartum        Encounter for supervision of other normal pregnancy, third trimester          Care Instructions    Return to office in one week and as needed.    Thank you for allowing Marcelo WOLF CNM and our OB team to participate in your care.  If you have a scheduling or an appointment question please contact Berna [facility] Unit Coordinator at their direct line [phone].   ALL nursing questions or concerns can be directed to your OB nurse at: [phone] - Sary                Follow-ups after your visit        Your next 10 appointments already scheduled     Sep 18, 2017  2:00 PM CDT   (Arrive by 1:45 PM)   ESTABLISHED PRENATAL with MARYANN Francisco CNM   [facility] [place] [facility]    [address]  [place] [address]   [phone]            Sep 18, 2017  4:00 PM CDT   Radiology with [facility] ULTRASOUND 2   [facility] ([facility] )    [address] [place] [address]  [place] [address]   [phone]            Sep 25, 2017  2:00 PM CDT   (Arrive by 1:45 PM)   ESTABLISHED PRENATAL with MARYANN Francisco CNM   [facility] ([facility] [place] )    [address]  [place] [address]   [phone]            Oct 02, 2017  2:00 PM CDT   (Arrive by 1:45 PM)   ESTABLISHED PRENATAL with MARYANN Francisco CNM   [facility] ([facility] [place] )    [address]  [place] [address]   [phone]              Who to contact     If you have questions or need follow up information about today's clinic visit or your schedule please contact [facility] directly at  "[phone].  Normal or non-critical lab and imaging results will be communicated to you by [facility] Noisehart, letter or phone within 4 business days after the clinic has received the results. If you do not hear from us within 7 days, please contact the clinic through [facility] [employer] or phone. If you have a critical or abnormal lab result, we will notify you by phone as soon as possible.  Submit refill requests through [employer] or call your pharmacy and they will forward the refill request to us. Please allow 3 business days for your refill to be completed.          Additional Information About Your Visit        You[employer] Noisehart Information     [employer] lets you send messages to your doctor, view your test results, renew your prescriptions, schedule appointments and more. To sign up, go to [email or web address] . Click on \"Log in\" on the left side of the screen, which will take you to the Welcome page. Then click on \"Sign up Now\" on the right side of the page.     You will be asked to enter the access code listed below, as well as some personal information. Please follow the directions to create your username and password.     Your access code is: [email or web address]  Expires: 12/10/2017  4:57 PM     Your access code will  in 90 days. If you need help or a new code, please call your [place] clinic or [phone].        Care EveryWhere ID     This is your Care EveryWhere ID. This could be used by other organizations to access your [place] medical records  [id number]        Your Vitals Were     Pulse Height Last Period BMI (Body Mass Index)          73 5' 3\" (1.6 m) 2016 (Exact Date) 28.34 kg/m2         Blood Pressure from Last 3 Encounters:   17 105/60   17 110/68   17 110/65    Weight from Last 3 Encounters:   17 160 lb (72.6 kg)   17 159 lb (72.1 kg)   17 161 lb (73 kg)               Primary Care Provider    None       No address on file        Equal Access to Services     BHARAT MCKEON AH: Hadii " jame Shell, wabyronda luyesseniaadaha, qaybta kaalmada rashda, lauren sandy mickiemike janejamarcus shirapee laraoulmike swapna. So [facility] [phone].    ATENCIÓN: Si habla español, tiene a cuellar disposición servicios gratuitos de asistencia lingüística. Llame al [phone].    We comply with applicable federal civil rights laws and [place] laws. We do not discriminate on the basis of race, color, national origin, age, disability sex, sexual orientation or gender identity.            Thank you!     Thank you for choosing [facility]  for your [facility]. Our goal is always to provide you with excellent care. Hearing back from our patients is one way we can continue to improve our services. Please take a few minutes to complete the written survey that you may receive in the mail after your visit with us. Thank you!             Your Updated Medication List - Protect others around you: Learn how to safely use, store and throw away your medicines at www.disposemymeds.org.          This list is accurate as of: 9/11/17  4:57 PM.  Always use your most recent med list.                   Brand Name Dispense Instructions for use Diagnosis    blood glucose monitoring test strip    ONE TOUCH VERIO IQ    200 each    Use to test blood sugar 4 times daily.    Diet controlled gestational diabetes mellitus (GDM), antepartum       FISH OIL OMEGA-3 PO           ONETOUCH DELICA LANCETS 33G Misc     100 each    1 each 4 times daily    Diet controlled gestational diabetes mellitus (GDM), antepartum       ONETOUCH VERIO FLEX SYSTEM W/DEVICE Kit     1 kit    1 kit 4 times daily    Diet controlled gestational diabetes mellitus (GDM), antepartum       PRENATAL VITAMINS PO      Take 1 tablet by mouth daily

## 2017-09-11 NOTE — PROGRESS NOTES
Doing well.  Baby active.  Denies bleeding or LOF.  Some contractions.  Reviewed Blood glucose log.  Only one post meal higher than 120.  FBS:  All below 90  Discussed:  Plan for other children for labor, 38 week US for EFW, birth plan.  PIH signs.  Labor.  Negative GBS negative.  Concerns with depression/stay at home mom.  Activities/getting out of the house.    EFW US next week.    RTO next week and PRN

## 2017-09-11 NOTE — PATIENT INSTRUCTIONS
Return to office in one week and as needed.    Thank you for allowing Marcelo WOLF CNM and our OB team to participate in your care.  If you have a scheduling or an appointment question please contact Berna [facility] Health Unit Coordinator at their direct line [phone].   ALL nursing questions or concerns can be directed to your OB nurse at: [phone] - Sary

## 2017-09-12 ASSESSMENT — ANXIETY QUESTIONNAIRES: GAD7 TOTAL SCORE: 2

## 2017-09-18 ENCOUNTER — HOSPITAL ENCOUNTER (OUTPATIENT)
Dept: ULTRASOUND IMAGING | Facility: HOSPITAL | Age: 37
Discharge: HOME | End: 2017-09-18
Attending: ADVANCED PRACTICE MIDWIFE | Admitting: ADVANCED PRACTICE MIDWIFE
Payer: COMMERCIAL

## 2017-09-18 ENCOUNTER — PRENATAL OFFICE VISIT (OUTPATIENT)
Dept: OBGYN | Facility: OTHER | Age: 37
End: 2017-09-18
Attending: ADVANCED PRACTICE MIDWIFE
Payer: COMMERCIAL

## 2017-09-18 VITALS
BODY MASS INDEX: 29.06 KG/M2 | HEIGHT: 63 IN | WEIGHT: 164 LBS | SYSTOLIC BLOOD PRESSURE: 102 MMHG | DIASTOLIC BLOOD PRESSURE: 60 MMHG

## 2017-09-18 DIAGNOSIS — Z34.83 ENCOUNTER FOR SUPERVISION OF OTHER NORMAL PREGNANCY, THIRD TRIMESTER: ICD-10-CM

## 2017-09-18 DIAGNOSIS — O99.810 ABNORMAL MATERNAL GLUCOSE TOLERANCE, ANTEPARTUM: ICD-10-CM

## 2017-09-18 PROCEDURE — 76816 OB US FOLLOW-UP PER FETUS: CPT | Mod: TC

## 2017-09-18 PROCEDURE — 99207 ZZC PRENATAL VISIT: CPT | Performed by: ADVANCED PRACTICE MIDWIFE

## 2017-09-18 NOTE — MR AVS SNAPSHOT
After Visit Summary   9/18/2017    Diana Chavis    MRN: [id number]           Patient Information     Date Of Birth          1980        Visit Information        Provider Department      9/18/2017 3:00 PM [facility]        Today's Diagnoses     Abnormal maternal glucose tolerance, antepartum        Encounter for supervision of other normal pregnancy, third trimester          Care Instructions    Return to office in one week for prenatal care and as needed.    Thank you for allowing Marcelo WOLF CNM and our OB team to participate in your care.  If you have a scheduling or an appointment question please contact [facility] Unit Coordinator at their direct line [phone].   ALL nursing questions or concerns can be directed to your OB nurse at: [phone] - Sary              Follow-ups after your visit        Your next 10 appointments already scheduled     Sep 25, 2017  2:00 PM CDT   (Arrive by 1:45 PM)   ESTABLISHED PRENATAL with MARYANN Francisco CNM   [facility] [place] ([facility] [place] )    [address]  [place] [address]   [phone]            Oct 02, 2017  2:00 PM CDT   (Arrive by 1:45 PM)   ESTABLISHED PRENATAL with MARYANN Francisco CNM   [facility] [place] ([facility] [place] )    [address]  [place] [address]   [phone]              Who to contact     If you have questions or need follow up information about today's clinic visit or your schedule please contact [facility] directly at [phone].  Normal or non-critical lab and imaging results will be communicated to you by MyChart, letter or phone within 4 business days after the clinic has received the results. If you do not hear from us within 7 days, please contact the clinic through MyChart or phone. If you have a critical or abnormal lab result, we will notify you by phone as soon as possible.  Submit  "refill requests through [employer] or call your pharmacy and they will forward the refill request to us. Please allow 3 business days for your refill to be completed.          Additional Information About Your Visit        Fruitday.[email or web address] Information     [employer] lets you send messages to your doctor, view your test results, renew your prescriptions, schedule appointments and more. To sign up, go to [email or web address] . Click on \"Log in\" on the left side of the screen, which will take you to the Welcome page. Then click on \"Sign up Now\" on the right side of the page.     You will be asked to enter the access code listed below, as well as some personal information. Please follow the directions to create your username and password.     Your access code is: [email or web address]  Expires: 12/10/2017  4:57 PM     Your access code will  in 90 days. If you need help or a new code, please call your [place] clinic or [phone].        Care EveryWhere ID     This is your Care EveryWhere ID. This could be used by other organizations to access your [place] medical records  [id number]        Your Vitals Were     Height Last Period BMI (Body Mass Index)             5' 3\" (1.6 m) 2016 (Exact Date) 29.05 kg/m2          Blood Pressure from Last 3 Encounters:   17 102/60   17 105/60   17 110/68    Weight from Last 3 Encounters:   17 164 lb (74.4 kg)   17 160 lb (72.6 kg)   17 159 lb (72.1 kg)              Today, you had the following     No orders found for display       Primary Care Provider    None       No address on file        Equal Access to Services     [facility] : Hadii jame Shell, kevin newberry, qapayton kaalmada rashad, lauren rees . So [facility] [phone].    ATENCIÓN: Si habla español, tiene a cuellar disposición servicios gratuitos de asistencia lingüística. Llame al [phone].    We comply with applicable federal civil rights laws and " [place] laws. We do not discriminate on the basis of race, color, national origin, age, disability sex, sexual orientation or gender identity.            Thank you!     Thank you for choosing [facility]  for your care. Our goal is always to provide you with excellent care. Hearing back from our patients is one way we can continue to improve our services. Please take a few minutes to complete the written survey that you may receive in the mail after your visit with us. Thank you!             Your Updated Medication List - Protect others around you: Learn how to safely use, store and throw away your medicines at www.disposemymeds.org.          This list is accurate as of: 9/18/17  4:44 PM.  Always use your most recent med list.                   Brand Name Dispense Instructions for use Diagnosis    blood glucose monitoring test strip    ONE TOUCH VERIO IQ    200 each    Use to test blood sugar 4 times daily.    Diet controlled gestational diabetes mellitus (GDM), antepartum       FISH OIL OMEGA-3 PO           ONETOUCH DELICA LANCETS 33G Misc     100 each    1 each 4 times daily    Diet controlled gestational diabetes mellitus (GDM), antepartum       ONETOUCH VERIO FLEX SYSTEM W/DEVICE Kit     1 kit    1 kit 4 times daily    Diet controlled gestational diabetes mellitus (GDM), antepartum       PRENATAL VITAMINS PO      Take 1 tablet by mouth daily

## 2017-09-18 NOTE — PATIENT INSTRUCTIONS
Return to office in one week for prenatal care and as needed.    Thank you for allowing Marcelo WOLF CNM and our OB team to participate in your care.  If you have a scheduling or an appointment question please contact Berna [facility] Health Unit Coordinator at their direct line [phone].   ALL nursing questions or concerns can be directed to your OB nurse at: [phone] - Sary

## 2017-09-18 NOTE — NURSING NOTE
"Chief Complaint   Patient presents with     Prenatal Care       Initial /60  Ht 5' 3\" (1.6 m)  Wt 164 lb (74.4 kg)  LMP 12/26/2016 (Exact Date)  BMI 29.05 kg/m2 Estimated body mass index is 29.05 kg/(m^2) as calculated from the following:    Height as of this encounter: 5' 3\" (1.6 m).    Weight as of this encounter: 164 lb (74.4 kg).  Medication Reconciliation: complete     JAKOB KAMINSKI      "

## 2017-09-25 ENCOUNTER — PRENATAL OFFICE VISIT (OUTPATIENT)
Dept: OBGYN | Facility: OTHER | Age: 37
End: 2017-09-25
Attending: ADVANCED PRACTICE MIDWIFE
Payer: COMMERCIAL

## 2017-09-25 VITALS
SYSTOLIC BLOOD PRESSURE: 105 MMHG | DIASTOLIC BLOOD PRESSURE: 70 MMHG | HEART RATE: 83 BPM | BODY MASS INDEX: 28.34 KG/M2 | WEIGHT: 160 LBS

## 2017-09-25 DIAGNOSIS — Z34.83 ENCOUNTER FOR SUPERVISION OF OTHER NORMAL PREGNANCY, THIRD TRIMESTER: Primary | ICD-10-CM

## 2017-09-25 DIAGNOSIS — O99.810 ABNORMAL MATERNAL GLUCOSE TOLERANCE, ANTEPARTUM: ICD-10-CM

## 2017-09-25 PROCEDURE — 99207 ZZC PRENATAL VISIT: CPT | Performed by: ADVANCED PRACTICE MIDWIFE

## 2017-09-25 ASSESSMENT — PAIN SCALES - GENERAL: PAINLEVEL: NO PAIN (0)

## 2017-09-25 NOTE — PATIENT INSTRUCTIONS
Return to office in one week for prenatal care and as needed.    Thank you for allowing Marcelo WOLF CNM and our OB team to participate in your care.  If you have a scheduling or an appointment question please contact Berna [facility] Health Unit Coordinator at their direct line [phone].   ALL nursing questions or concerns can be directed to your OB nurse at: [phone] - Sary

## 2017-09-25 NOTE — NURSING NOTE
"Chief Complaint   Patient presents with     Prenatal Care       Initial /70  Pulse 83  Wt 160 lb (72.6 kg)  LMP 12/26/2016 (Exact Date)  BMI 28.34 kg/m2 Estimated body mass index is 28.34 kg/(m^2) as calculated from the following:    Height as of 9/18/17: 5' 3\" (1.6 m).    Weight as of this encounter: 160 lb (72.6 kg).  Medication Reconciliation: complete     Edwige Cheng      "

## 2017-09-25 NOTE — PROGRESS NOTES
Doing well.  Baby active.  Denies bleeding or LOF.  Reports some contractions.  Reviewed Blood glucose log:  Good.  No FBS > 90  Reviewed US.  EFW in the 35 percentile.  Birth control after birth:  Condoms.  Plans for other children when labor starts.  RTO in one week for prenatal care and PRN.

## 2017-09-25 NOTE — MR AVS SNAPSHOT
After Visit Summary   9/25/2017    Diana Chavis    MRN: [id number]           Patient Information     Date Of Birth          1980        Visit Information        Provider Department      9/25/2017 2:00 PM Marcelo Roberts APRN CNM [facility] [place]        Today's Diagnoses     Encounter for supervision of other normal pregnancy, third trimester    -  1    Abnormal maternal glucose tolerance, antepartum          Care Instructions    Return to office in one week for prenatal care and as needed.    Thank you for allowing Marcelo WOLF CNM and our OB team to participate in your care.  If you have a scheduling or an appointment question please contact [facility] Unit Coordinator at their direct line [phone].   ALL nursing questions or concerns can be directed to your OB nurse at: [phone] - Sary            Follow-ups after your visit        Your next 10 appointments already scheduled     Oct 02, 2017  2:00 PM CDT   (Arrive by 1:45 PM)   ESTABLISHED PRENATAL with MARYANN Francisco CNM   [facility] [place] ([facility] )    [address]  [place] [address]   [phone]              Who to contact     If you have questions or need follow up information about today's clinic visit or your schedule please contact [facility] directly at [phone].  Normal or non-critical lab and imaging results will be communicated to you by MyChart, letter or phone within 4 business days after the clinic has received the results. If you do not hear from us within 7 days, please contact the clinic through MyChart or phone. If you have a critical or abnormal lab result, we will notify you by phone as soon as possible.  Submit refill requests through [employer] or call your pharmacy and they will forward the refill request to us. Please allow 3 business days for your refill to be completed.          Additional Information About Your Visit        [employer]  "Information     [employer] lets you send messages to your doctor, view your test results, renew your prescriptions, schedule appointments and more. To sign up, go to [email or web address] . Click on \"Log in\" on the left side of the screen, which will take you to the Welcome page. Then click on \"Sign up Now\" on the right side of the page.     You will be asked to enter the access code listed below, as well as some personal information. Please follow the directions to create your username and password.     Your access code is: [email or web address]  Expires: 12/10/2017  4:57 PM     Your access code will  in 90 days. If you need help or a new code, please call your [place] clinic or [phone].        Care EveryWhere ID     This is your Care EveryWhere ID. This could be used by other organizations to access your [place] medical records  [id number]        Your Vitals Were     Pulse Last Period BMI (Body Mass Index)             83 2016 (Exact Date) 28.34 kg/m2          Blood Pressure from Last 3 Encounters:   17 105/70   17 102/60   17 105/60    Weight from Last 3 Encounters:   17 160 lb (72.6 kg)   17 164 lb (74.4 kg)   17 160 lb (72.6 kg)              Today, you had the following     No orders found for display       Primary Care Provider    None Specified       No primary provider on file.        Equal Access to Services     [facility] : Hadii jame wayo Soestela, waaxda luyesseniaadaha, qaybta kaalmada lauren solorzano . So [facility] [phone].    ATENCIÓN: Si habla español, tiene a cuellar disposición servicios gratuitos de asistencia lingüística. Llame al [phone].    We comply with applicable federal civil rights laws and [place] laws. We do not discriminate on the basis of race, color, national origin, age, disability sex, sexual orientation or gender identity.            Thank you!     Thank you for choosing [facility] [place]  " for your care. Our goal is always to provide you with excellent care. Hearing back from our patients is one way we can continue to improve our services. Please take a few minutes to complete the written survey that you may receive in the mail after your visit with us. Thank you!             Your Updated Medication List - Protect others around you: Learn how to safely use, store and throw away your medicines at www.disposemymeds.org.          This list is accurate as of: 9/25/17  2:52 PM.  Always use your most recent med list.                   Brand Name Dispense Instructions for use Diagnosis    blood glucose monitoring test strip    ONE TOUCH VERIO IQ    200 each    Use to test blood sugar 4 times daily.    Diet controlled gestational diabetes mellitus (GDM), antepartum       FISH OIL OMEGA-3 PO           ONETOUCH DELICA LANCETS 33G Misc     100 each    1 each 4 times daily    Diet controlled gestational diabetes mellitus (GDM), antepartum       ONETOUCH VERIO FLEX SYSTEM W/DEVICE Kit     1 kit    1 kit 4 times daily    Diet controlled gestational diabetes mellitus (GDM), antepartum       PRENATAL VITAMINS PO      Take 1 tablet by mouth daily

## 2017-10-02 ENCOUNTER — PRENATAL OFFICE VISIT (OUTPATIENT)
Dept: OBGYN | Facility: OTHER | Age: 37
End: 2017-10-02
Attending: ADVANCED PRACTICE MIDWIFE
Payer: COMMERCIAL

## 2017-10-02 VITALS
OXYGEN SATURATION: 97 % | HEART RATE: 66 BPM | SYSTOLIC BLOOD PRESSURE: 104 MMHG | WEIGHT: 162 LBS | DIASTOLIC BLOOD PRESSURE: 60 MMHG | HEIGHT: 63 IN | BODY MASS INDEX: 28.7 KG/M2

## 2017-10-02 DIAGNOSIS — O99.810 ABNORMAL MATERNAL GLUCOSE TOLERANCE, ANTEPARTUM: ICD-10-CM

## 2017-10-02 PROCEDURE — 76815 OB US LIMITED FETUS(S): CPT | Performed by: ADVANCED PRACTICE MIDWIFE

## 2017-10-02 PROCEDURE — 99207 ZZC PRENATAL VISIT: CPT | Mod: 25 | Performed by: ADVANCED PRACTICE MIDWIFE

## 2017-10-02 ASSESSMENT — PAIN SCALES - GENERAL: PAINLEVEL: NO PAIN (0)

## 2017-10-02 NOTE — PROGRESS NOTES
Doing well.  Baby active.    Denies bleeding or LOF.  Some mild contractions.    Reviewed blood glucose log:  FBS-none 90 or greater  Post meal-only one above 120  Discussed kick counts, IOL after 41 weeks    Clinical US:  Cephalic.  Amniotic fluid single pocket > 2 cm.    RTO next week for prenatal care and PRN.    Marcelo Roberts, APRN, CNM

## 2017-10-02 NOTE — NURSING NOTE
"Chief Complaint   Patient presents with     Prenatal Care     40w0d       Initial /60 (BP Location: Left arm, Patient Position: Chair, Cuff Size: Adult Regular)  Pulse 66  Ht 5' 3\" (1.6 m)  Wt 162 lb (73.5 kg)  LMP 12/26/2016 (Exact Date)  SpO2 97%  BMI 28.7 kg/m2 Estimated body mass index is 28.7 kg/(m^2) as calculated from the following:    Height as of this encounter: 5' 3\" (1.6 m).    Weight as of this encounter: 162 lb (73.5 kg).  Medication Reconciliation: mandy Lloyd      "

## 2017-10-02 NOTE — PATIENT INSTRUCTIONS
Return to office next week for prenatal care and as needed.    Thank you for allowing Marcelo WOLF CNM and our OB team to participate in your care.  If you have a scheduling or an appointment question please contact [facility] Unit Coordinator at their direct line [phone].   ALL nursing questions or concerns can be directed to your OB nurse at: [phone] - Sary

## 2017-10-02 NOTE — MR AVS SNAPSHOT
After Visit Summary   10/2/2017    Diana Chavis    MRN: [id number]           Patient Information     Date Of Birth          1980        Visit Information        Provider Department      10/2/2017 2:00 PM Marcelo Roberts APRN CNM [facility] [place]        Today's Diagnoses     Abnormal maternal glucose tolerance, antepartum          Care Instructions    Return to office next week for prenatal care and as needed.    Thank you for allowing Marcelo WOLF CNM and our OB team to participate in your care.  If you have a scheduling or an appointment question please contact [facility] Unit Coordinator at their direct line [phone].   ALL nursing questions or concerns can be directed to your OB nurse at: [phone] - Sary              Follow-ups after your visit        Your next 10 appointments already scheduled     Oct 09, 2017  2:00 PM CDT   (Arrive by 1:45 PM)   ESTABLISHED PRENATAL with MARYANN Francisco CNM   [facility] [place] ([facility] )    [address]  [place] [address]   [phone]              Who to contact     If you have questions or need follow up information about today's clinic visit or your schedule please contact [facility] directly at [phone].  Normal or non-critical lab and imaging results will be communicated to you by [facility], letter or phone within 4 business days after the clinic has received the results. If you do not hear from us within 7 days, please contact the clinic through [facility] or phone. If you have a critical or abnormal lab result, we will notify you by phone as soon as possible.  Submit refill requests through [employer] or call your pharmacy and they will forward the refill request to us. Please allow 3 business days for your refill to be completed.          Additional Information About Your Visit        Opal Labshart Information     [employer] lets you send messages to your doctor, view your test  "results, renew your prescriptions, schedule appointments and more. To sign up, go to [email or web address] . Click on \"Log in\" on the left side of the screen, which will take you to the Welcome page. Then click on \"Sign up Now\" on the right side of the page.     You will be asked to enter the access code listed below, as well as some personal information. Please follow the directions to create your username and password.     Your access code is: [email or web address]  Expires: 12/10/2017  4:57 PM     Your access code will  in 90 days. If you need help or a new code, please call your [place] clinic or [phone].        Care EveryWhere ID     This is your Care EveryWhere ID. This could be used by other organizations to access your [place] medical records  [id number]        Your Vitals Were     Pulse Height Last Period Pulse Oximetry BMI (Body Mass Index)       66 5' 3\" (1.6 m) 2016 (Exact Date) 97% 28.7 kg/m2        Blood Pressure from Last 3 Encounters:   10/02/17 104/60   17 105/70   17 102/60    Weight from Last 3 Encounters:   10/02/17 162 lb (73.5 kg)   17 160 lb (72.6 kg)   17 164 lb (74.4 kg)              We Performed the Following     US OB LIMITED, 1 OR MORE FETUSES        Primary Care Provider    None Specified       No primary provider on file.        Equal Access to Services     [facility]: Hadii jame Shell, wabyronda coni, qaybta kaallauren russell . So [facility] [phone].    ATENCIÓN: Si habla español, tiene a cuellar disposición servicios gratuitos de asistencia lingüística. Llame al [phone].    We comply with applicable federal civil rights laws and [place] laws. We do not discriminate on the basis of race, color, national origin, age, disability, sex, sexual orientation, or gender identity.            Thank you!     Thank you for choosing [facility]  for your care. Our goal is always to " provide you with excellent care. Hearing back from our patients is one way we can continue to improve our services. Please take a few minutes to complete the written survey that you may receive in the mail after your visit with us. Thank you!             Your Updated Medication List - Protect others around you: Learn how to safely use, store and throw away your medicines at www.disposemymeds.org.          This list is accurate as of: 10/2/17  3:12 PM.  Always use your most recent med list.                   Brand Name Dispense Instructions for use Diagnosis    blood glucose monitoring test strip    ONE TOUCH VERIO IQ    200 each    Use to test blood sugar 4 times daily.    Diet controlled gestational diabetes mellitus (GDM), antepartum       FISH OIL OMEGA-3 PO           ONETOUCH DELICA LANCETS 33G Misc     100 each    1 each 4 times daily    Diet controlled gestational diabetes mellitus (GDM), antepartum       ONETOUCH VERIO FLEX SYSTEM W/DEVICE Kit     1 kit    1 kit 4 times daily    Diet controlled gestational diabetes mellitus (GDM), antepartum       PRENATAL VITAMINS PO      Take 1 tablet by mouth daily

## 2017-10-05 ENCOUNTER — HOSPITAL ENCOUNTER (INPATIENT)
Facility: HOSPITAL | Age: 37
LOS: 2 days | Discharge: HOME | End: 2017-10-07
Attending: ADVANCED PRACTICE MIDWIFE | Admitting: ADVANCED PRACTICE MIDWIFE
Payer: COMMERCIAL

## 2017-10-05 DIAGNOSIS — O92.29 SORE NIPPLES DUE TO LACTATION: ICD-10-CM

## 2017-10-05 PROBLEM — Z36.89 ENCOUNTER FOR TRIAGE IN PREGNANT PATIENT: Status: ACTIVE | Noted: 2017-10-05

## 2017-10-05 PROBLEM — Z37.9 NORMAL LABOR: Status: ACTIVE | Noted: 2017-10-05

## 2017-10-05 LAB
A1 MICROGLOB PLACENTAL VAG QL: POSITIVE
ABO + RH BLD: NORMAL
ABO + RH BLD: NORMAL
BLD GP AB SCN SERPL QL: NORMAL
BLOOD BANK CMNT PATIENT-IMP: NORMAL
BLOOD BANK CMNT PATIENT-IMP: NORMAL
HGB BLD-MCNC: 13.6 G/DL (ref 11.7–15.7)
PLATELET # BLD AUTO: 161 10E9/L (ref 150–450)
SPECIMEN EXP DATE BLD: NORMAL

## 2017-10-05 PROCEDURE — 25000125 ZZHC RX 250: Performed by: ADVANCED PRACTICE MIDWIFE

## 2017-10-05 PROCEDURE — 85018 HEMOGLOBIN: CPT | Performed by: ADVANCED PRACTICE MIDWIFE

## 2017-10-05 PROCEDURE — 86901 BLOOD TYPING SEROLOGIC RH(D): CPT | Performed by: ADVANCED PRACTICE MIDWIFE

## 2017-10-05 PROCEDURE — 12000031 ZZH R&B OB CRITICAL

## 2017-10-05 PROCEDURE — 10907ZC DRAINAGE OF AMNIOTIC FLUID, THERAPEUTIC FROM PRODUCTS OF CONCEPTION, VIA NATURAL OR ARTIFICIAL OPENING: ICD-10-PCS | Performed by: ADVANCED PRACTICE MIDWIFE

## 2017-10-05 PROCEDURE — 59025 FETAL NON-STRESS TEST: CPT

## 2017-10-05 PROCEDURE — 99215 OFFICE O/P EST HI 40 MIN: CPT | Mod: 25

## 2017-10-05 PROCEDURE — 86900 BLOOD TYPING SEROLOGIC ABO: CPT | Performed by: ADVANCED PRACTICE MIDWIFE

## 2017-10-05 PROCEDURE — 85049 AUTOMATED PLATELET COUNT: CPT | Performed by: ADVANCED PRACTICE MIDWIFE

## 2017-10-05 PROCEDURE — 84112 EVAL AMNIOTIC FLUID PROTEIN: CPT | Performed by: ADVANCED PRACTICE MIDWIFE

## 2017-10-05 PROCEDURE — 36415 COLL VENOUS BLD VENIPUNCTURE: CPT | Performed by: ADVANCED PRACTICE MIDWIFE

## 2017-10-05 PROCEDURE — 86780 TREPONEMA PALLIDUM: CPT | Performed by: ADVANCED PRACTICE MIDWIFE

## 2017-10-05 PROCEDURE — 86850 RBC ANTIBODY SCREEN: CPT | Performed by: ADVANCED PRACTICE MIDWIFE

## 2017-10-05 RX ORDER — MISOPROSTOL 200 UG/1
200 TABLET ORAL
Status: DISCONTINUED | OUTPATIENT
Start: 2017-10-05 | End: 2017-10-06

## 2017-10-05 RX ORDER — LIDOCAINE 50 MG/G
OINTMENT TOPICAL DAILY PRN
Status: DISCONTINUED | OUTPATIENT
Start: 2017-10-05 | End: 2017-10-06

## 2017-10-05 RX ORDER — CARBOPROST TROMETHAMINE 250 UG/ML
250 INJECTION, SOLUTION INTRAMUSCULAR
Status: DISCONTINUED | OUTPATIENT
Start: 2017-10-05 | End: 2017-10-06

## 2017-10-05 RX ORDER — LIDOCAINE HYDROCHLORIDE 10 MG/ML
10 INJECTION, SOLUTION EPIDURAL; INFILTRATION; INTRACAUDAL; PERINEURAL ONCE
Status: DISCONTINUED | OUTPATIENT
Start: 2017-10-05 | End: 2017-10-06

## 2017-10-05 RX ORDER — LIDOCAINE HYDROCHLORIDE 10 MG/ML
INJECTION, SOLUTION EPIDURAL; INFILTRATION; INTRACAUDAL; PERINEURAL
Status: DISPENSED
Start: 2017-10-05 | End: 2017-10-06

## 2017-10-05 RX ORDER — OXYTOCIN/0.9 % SODIUM CHLORIDE 30/500 ML
100-340 PLASTIC BAG, INJECTION (ML) INTRAVENOUS CONTINUOUS PRN
Status: COMPLETED | OUTPATIENT
Start: 2017-10-05 | End: 2017-10-05

## 2017-10-05 RX ORDER — METHYLERGONOVINE MALEATE 0.2 MG/ML
200 INJECTION INTRAVENOUS
Status: DISCONTINUED | OUTPATIENT
Start: 2017-10-05 | End: 2017-10-06

## 2017-10-05 RX ORDER — IBUPROFEN 800 MG/1
800 TABLET, FILM COATED ORAL
Status: COMPLETED | OUTPATIENT
Start: 2017-10-05 | End: 2017-10-06

## 2017-10-05 RX ORDER — OXYTOCIN 10 [USP'U]/ML
10 INJECTION, SOLUTION INTRAMUSCULAR; INTRAVENOUS
Status: DISCONTINUED | OUTPATIENT
Start: 2017-10-05 | End: 2017-10-06

## 2017-10-05 RX ORDER — MISOPROSTOL 200 UG/1
800 TABLET ORAL ONCE
Status: DISCONTINUED | OUTPATIENT
Start: 2017-10-05 | End: 2017-10-06

## 2017-10-05 RX ADMIN — OXYTOCIN-SODIUM CHLORIDE 0.9% IV SOLN 30 UNIT/500ML 340 ML/HR: 30-0.9/5 SOLUTION at 23:58

## 2017-10-05 NOTE — H&P
ADMISSION NOTE FOR Diana Chavis on 10/5/2017.    H and P unchanged from prenatal   Lungs clear  hrt RRR    Diana Chavis is a 37 year old female  40w3d  Estimated Date of Delivery: Oct 2, 2017 is calculated from Patient's last menstrual period was 2016 (exact date). [facility] at 3:06 PM  in early labor.     Membranes are ruptured since 1320 and verified with sterile speculum exam by positive amnisure     PRENATAL COURSE   Prenatal vital signs WNL   Prenatal course was essentially uncomplicated     HISTORIES   No Known Allergies   No past medical history on file.   Past Surgical History:   Procedure Laterality Date     EXTRACTION(S) DENTAL      [place] teeth extraction       Family History   Problem Relation Age of Onset     Hypertension Father      Asthma Father      HEART DISEASE Maternal Grandmother      CEREBROVASCULAR DISEASE Maternal Grandmother      DIABETES Maternal Grandmother      Lung Cancer Paternal Grandfather      Tuberculosis Paternal Grandfather      Migraines Paternal Grandmother      Thyroid Disease Paternal Aunt      Down Syndrome Maternal Uncle      DIABETES Maternal Aunt      Migraines Maternal Aunt       Social History   Substance Use Topics     Smoking status: Never Smoker     Smokeless tobacco: Never Used     Alcohol use No      Obstetric History       T3      L3     SAB0   TAB0   Ectopic0   Multiple0   Live Births3       # Outcome Date GA Lbr Leonid/2nd Weight Sex Delivery Anes PTL Lv   5 Current            4 SAB 16 12w2d          3 Term 01/04/15 39w4d  3.969 kg (8 lb 12 oz) M Vag-Spont   JEREMY      Name: Treivn   2 Term 12 39w5d  3.459 kg (7 lb 10 oz) F Vag-Spont   JEREMY      Name: Darling   1 Term 12/31/10 40w0d  3.118 kg (6 lb 14 oz) F Vag-Spont   JEREMY      Name: Sowmya           LABS:     Lab Results   Component Value Date    ABO O 2017           Lab Results   Component Value Date    RH Pos 2017      Rhogam  not indicated   Lab Results   Component Value Date    RUBELLAABIGG 18.3 03/16/2017      No results found for: RPR   No results found for: HIV   Lab Results   Component Value Date    HGB 11.9 07/12/2017      Lab Results   Component Value Date    HEPBANG Negative 03/16/2017      Lab Results   Component Value Date    GBS Negative 09/05/2017      Other significant lab:    None.     PHYSICAL EXAM:     /83  Pulse 101  Temp 97.9  F (36.6  C) (Oral)  Resp 16  LMP 12/26/2016 (Exact Date)  SpO2 97%  Neuro: denies headache and visual disturbances   Edema none Reflexes +2     Abdomen: gravid, single vertex fetus, non-tender, EFW 7 lb 4 oz     FETAL HEART TONES Cat I  Cervix 5 cm/60%/-2    ASSESSMENT:   IUP @ 40w3d in early labor.  NST reactive.       PLAN:   Prepare for delivery     MARYANN Tsang, MARC

## 2017-10-05 NOTE — IP AVS SNAPSHOT
MRN:[id number]                      After Visit Summary   10/5/2017    Diana Chavis    MRN: [id number]           Thank you!     Thank you for choosing [facility] for your care. Our goal is always to provide you with excellent care. Hearing back from our patients is one way we can continue to improve our services. Please take a few minutes to complete the written survey that you may receive in the mail after you visit with us. Thank you!        Patient Information     Date Of Birth          1980        Designated Caregiver       Most Recent Value    Caregiver    Will someone help with your care after discharge? yes    Name of designated caregiver Guy    Phone number of caregiver in chart,     Caregiver address same as patient      About your hospital stay     You were admitted on:  October 5, 2017 You last received care in the:  [facility]    You were discharged on:  October 7, 2017        Reason for your hospital stay       Normal spontaneous vaginal delivery                  Who to Call     For medical emergencies, please call 911.  For non-urgent questions about your medical care, please call your primary care provider or clinic, None          Attending Provider     Provider Specialty    Marcelo Roberts APRN CNM [facility]       Primary Care Provider Fax #    Physician No Ref-Primary [phone]      After Care Instructions     Diet       Follow this diet upon discharge: Regular                  Follow-up Appointments     Follow-up and recommended labs and tests        See Marcelo Merrill CNM in clinic in 2 and 6 weeks.                  Your next 10 appointments already scheduled     Oct 09, 2017  2:00 PM CDT   (Arrive by 1:45 PM)   ESTABLISHED PRENATAL with MARYANN Francisco CNM   [facility] ([facility] )    [address] [place] [address]   [phone]              Further instructions from your care team       Postpartum Vaginal Delivery  Instructions    Activity       Ask family and friends for help when you need it.    Do not place anything in your vagina for 6 weeks.    You are not restricted on other activities, but take it easy for a few weeks to allow your body to recover from delivery.  You are able to do any activities you feel up to that point.    No driving until you have stopped taking your pain medications (usually two weeks after delivery).     Call your health care provider if you have any of these symptoms:       Increased pain, swelling, redness, or fluid around your stiches from an episiotomy or perineal tear.    A fever above 100.4 F (38 C) with or without chills when placing a thermometer under your tongue.    You soak a sanitary pad with blood within 1 hour, or you see blood clots larger than a golf ball.    Bleeding that lasts more than 6 weeks.    Vaginal discharge that smells bad.    Severe pain, cramping or tenderness in your lower belly area.    A need to urinate more frequently (use the toilet more often), more urgently (use the toilet very quickly), or it burns when you urinate.    Nausea and vomiting.    Redness, swelling or pain around a vein in your leg.    Problems breastfeeding or a red or painful area on your breast.    Chest pain and cough or are gasping for air.    Problems coping with sadness, anxiety, or depression.  If you have any concerns about hurting yourself or the baby, call your provider immediately.     You have questions or concerns after you return home.     Keep your hands clean:  Always wash your hands before touching your perineal area and stitches.  This helps reduce your risk of infection.  If your hands aren't dirty, you may use an alcohol hand-rub to clean your hands. Keep your nails clean and short.        Pending Results     No orders found from 10/3/2017 to 10/6/2017.            Statement of Approval     Ordered          10/07/17 1117  I have reviewed and agree with all the recommendations and  "orders detailed in this document.  EFFECTIVE NOW     Approved and electronically signed by:  Rishi Coleman MD             Admission Information     Date & Time Provider Department Dept. Phone    10/5/2017 Marcelo Roberts, APRN [facility] Labor and Delivery [phone]      Your Vitals Were     Blood Pressure Pulse Temperature Respirations Last Period Pulse Oximetry    113/71 76 97.6  F (36.4  C) (Oral) 16 2016 (Exact Date) 97%      MyChart Information     [employer] lets you send messages to your doctor, view your test results, renew your prescriptions, schedule appointments and more. To sign up, go to [email or web address] . Click on \"Log in\" on the left side of the screen, which will take you to the Welcome page. Then click on \"Sign up Now\" on the right side of the page.     You will be asked to enter the access code listed below, as well as some personal information. Please follow the directions to create your username and password.     Your access code is: [email or web address]  Expires: 12/10/2017  4:57 PM     Your access code will  in 90 days. If you need help or a new code, please call your [place] clinic or [phone].        Care EveryWhere ID     This is your Care EveryWhere ID. This could be used by other organizations to access your [place] medical records  [id number]        Equal Access to Services     [facility] : Hadii aad ku hadasho Soestela, waaxda luqadaha, qaybta kaalmada rashad, lauren rees . So [facility] [phone].    ATENCIÓN: Si habla español, tiene a cuellar disposición servicios gratuitos de asistencia lingüística. Llame al [phone].    We comply with applicable federal civil rights laws and [place] laws. We do not discriminate on the basis of race, color, national origin, age, disability, sex, sexual orientation, or gender identity.               Review of your medicines      START taking        Dose / Directions    docusate sodium 100 MG capsule "   Commonly known as:  COLACE        Dose:  100 mg   Take 1 capsule (100 mg) by mouth 2 times daily   Quantity:  40 capsule   Refills:  1       ibuprofen 800 MG tablet   Commonly known as:  ADVIL/MOTRIN        Dose:  800 mg   Take 1 tablet (800 mg) by mouth every 8 hours   Quantity:  90 tablet   Refills:  1       lanolin ointment   Used for:  Sore nipples due to lactation        Apply topically every hour as needed for dry skin (sore nipples)   Quantity:  40 g   Refills:  3         CONTINUE these medicines which have NOT CHANGED        Dose / Directions    blood glucose monitoring test strip   Commonly known as:  ONE TOUCH VERIO IQ   Used for:  Diet controlled gestational diabetes mellitus (GDM), antepartum        Use to test blood sugar 4 times daily.   Quantity:  200 each   Refills:  5       FISH OIL OMEGA-3 PO        Refills:  0       ONETOUCH DELICA LANCETS 33G Misc   Used for:  Diet controlled gestational diabetes mellitus (GDM), antepartum        Dose:  1 each   1 each 4 times daily   Quantity:  100 each   Refills:  4       ONETOUCH VERIO FLEX SYSTEM W/DEVICE Kit   Used for:  Diet controlled gestational diabetes mellitus (GDM), antepartum        Dose:  1 kit   1 kit 4 times daily   Quantity:  1 kit   Refills:  0       PRENATAL VITAMINS PO        Dose:  1 tablet   Take 1 tablet by mouth daily   Refills:  0            Where to get your medicines      These medications were sent to [facility] [address] [address] AT [facility] [address]  [address] [facility] [address]     Phone:  [phone]     docusate sodium 100 MG capsule    ibuprofen 800 MG tablet    lanolin ointment                Protect others around you: Learn how to safely use, store and throw away your medicines at www.disposemymeds.org.             Medication List: This is a list of all your medications and when to take them. Check marks below indicate your daily home schedule. Keep this list as a  reference.      Medications           Morning Afternoon Evening Bedtime As Needed    blood glucose monitoring test strip   Commonly known as:  ONE TOUCH VERIO IQ   Use to test blood sugar 4 times daily.                                docusate sodium 100 MG capsule   Commonly known as:  COLACE   Take 1 capsule (100 mg) by mouth 2 times daily                                FISH OIL OMEGA-3 PO                                ibuprofen 800 MG tablet   Commonly known as:  ADVIL/MOTRIN   Take 1 tablet (800 mg) by mouth every 8 hours   Last time this was given:  800 mg on 10/7/2017  8:47 AM                                lanolin ointment   Apply topically every hour as needed for dry skin (sore nipples)                                ONETOUCH DELICA LANCETS 33G Misc   1 each 4 times daily                                ONETOUCH VERIO FLEX SYSTEM W/DEVICE Kit   1 kit 4 times daily                                PRENATAL VITAMINS PO   Take 1 tablet by mouth daily

## 2017-10-05 NOTE — PLAN OF CARE
Labor Admission  iDana Chavis  MRN: [id number]  Gestational Age: 40w3d      Diana Chavis is admitted for active labor.  States addison off and on since yesterday, 10-4-17. [facility], denies vaginal bleeding, contractions irregular. Patient has an extensive birth plan and does not want to be asked about discomfort.    Marcelo Roberts CNM  notified of arrival and condition and Intrapartum orders initiated.  Amnisure performed by Marcelo tested positive for amniotic fluid.    FHT: 130's  NST: Reactive .  Uterine Assessment: Contractions irregular at 3-6 minutes.      Patient is alert and oriented X 3,Patient oriented to room, unit, hourly rounding, and plan of care.  Call light within reach. Explained admission packet with patient bill of rights brochure. Will continue to monitor and document as needed.     Inpatient nursing criteria listed below was met:    Health care directives status obtained and documented: No  Patient identifies a surrogate decision maker: Yes   If yes, who:Guy  Contact Information:resides with patient.Core Measure diagnosis present:: No  Vaccine assessment done and vaccines ordered if appropriate. No  Clergy visit ordered if patient requests: No  Skin issues/needs documented:N/A  Isolation needs addressed, if appropriate: N/A  Fall Prevention (Med and High risk): Care plan updated, Education given and documented and signage used: N/A  Care Plan initiated: Yes  Education Documented (Reminder to educate patient if MRSA is present on admission): Yes  Education Assessment documented:Yes  Patient has discharge needs (If yes, please explain): No

## 2017-10-05 NOTE — IP AVS SNAPSHOT
[facility]    [address] [address]    Phone:  [phone]    Fax:  [phone]                                       After Visit Summary   10/5/2017    Diana Chavis    MRN: [id number]           After Visit Summary Signature Page     I have received my discharge instructions, and my questions have been answered. I have discussed any challenges I see with this plan with the nurse or doctor.    ..........................................................................................................................................  Patient/Patient Representative Signature      ..........................................................................................................................................  Patient Representative Print Name and Relationship to Patient    ..................................................               ................................................  Date                                            Time    ..........................................................................................................................................  Reviewed by Signature/Title    ...................................................              ..............................................  Date                                                            Time

## 2017-10-05 NOTE — PROGRESS NOTES
Cervix: Dilation: no exam  Fetal Heart Rate Tracing: Doppler intermittent  120 with accel heard  Contractions  Pt reports increasing in frequency and intensity  Comfort level Tolerating well    Assessment:   Tolerating labor well    Plan:   Prepare for delivery    MARYANN Tsang, CNM

## 2017-10-06 PROBLEM — Z37.9 NORMAL LABOR: Status: RESOLVED | Noted: 2017-10-05 | Resolved: 2017-10-06

## 2017-10-06 PROCEDURE — 59400 OBSTETRICAL CARE: CPT | Performed by: ADVANCED PRACTICE MIDWIFE

## 2017-10-06 PROCEDURE — 0KQM0ZZ REPAIR PERINEUM MUSCLE, OPEN APPROACH: ICD-10-PCS | Performed by: ADVANCED PRACTICE MIDWIFE

## 2017-10-06 PROCEDURE — 25000132 ZZH RX MED GY IP 250 OP 250 PS 637: Performed by: ADVANCED PRACTICE MIDWIFE

## 2017-10-06 PROCEDURE — 12000027 ZZH R&B OB

## 2017-10-06 RX ORDER — ACETAMINOPHEN 325 MG/1
325-650 TABLET ORAL EVERY 4 HOURS PRN
Status: DISCONTINUED | OUTPATIENT
Start: 2017-10-06 | End: 2017-10-07 | Stop reason: HOSPADM

## 2017-10-06 RX ORDER — IBUPROFEN 800 MG/1
800 TABLET, FILM COATED ORAL EVERY 8 HOURS
Status: DISCONTINUED | OUTPATIENT
Start: 2017-10-06 | End: 2017-10-07 | Stop reason: HOSPADM

## 2017-10-06 RX ORDER — LANOLIN 100 %
OINTMENT (GRAM) TOPICAL
Status: DISCONTINUED | OUTPATIENT
Start: 2017-10-06 | End: 2017-10-07 | Stop reason: HOSPADM

## 2017-10-06 RX ORDER — DOCUSATE SODIUM 100 MG/1
100 CAPSULE, LIQUID FILLED ORAL 2 TIMES DAILY
Status: DISCONTINUED | OUTPATIENT
Start: 2017-10-06 | End: 2017-10-07 | Stop reason: HOSPADM

## 2017-10-06 RX ORDER — OXYTOCIN 10 [USP'U]/ML
10 INJECTION, SOLUTION INTRAMUSCULAR; INTRAVENOUS
Status: DISCONTINUED | OUTPATIENT
Start: 2017-10-06 | End: 2017-10-07 | Stop reason: HOSPADM

## 2017-10-06 RX ORDER — OXYTOCIN/0.9 % SODIUM CHLORIDE 30/500 ML
340 PLASTIC BAG, INJECTION (ML) INTRAVENOUS CONTINUOUS PRN
Status: DISCONTINUED | OUTPATIENT
Start: 2017-10-06 | End: 2017-10-07 | Stop reason: HOSPADM

## 2017-10-06 RX ADMIN — IBUPROFEN 800 MG: 800 TABLET ORAL at 00:26

## 2017-10-06 RX ADMIN — IBUPROFEN 800 MG: 800 TABLET ORAL at 16:12

## 2017-10-06 RX ADMIN — IBUPROFEN 800 MG: 800 TABLET ORAL at 08:05

## 2017-10-06 NOTE — PLAN OF CARE
Problem: Postpartum (Vaginal Delivery) (Adult,Obstetrics,Pediatric)  Goal: Signs and Symptoms of Listed Potential Problems Will be Absent, Minimized or Managed (Postpartum)  Signs and symptoms of listed potential problems will be absent, minimized or managed by discharge/transition of care (reference Postpartum (Vaginal Delivery) (Adult,Obstetrics,Pediatric) CPG).   Outcome: Improving    10/06/17 0826   Postpartum (Vaginal Delivery)   Problems Assessed (Postpartum Vaginal Delivery) all   Problems Present (Postpartum Vag Deliv) none         Assessments completed as charted. B/P: 104/57, T: 98.6, P: 70, R: 16. Rates pain: only has cramping while breastfeeding, does not feel she can give her pain a number at this time. Voiding without difficulty. Fundus: Midline and firm. Lochia: Light. Activity: unrestricted with out pain  and normal activity. Infant feeding: Breast feeding going well.      LATCH Score:   Latch: 2 - Good Latch  Audible Swallowin - Spontaneous & frequent  Type of Nipple: (Breast/Nipple) 2 - Everted  Comfort: 2 - Soft, Nontender  Hold: 2 - No Assist   Total LATCH Score:   10     Postpartum breastfeeding assessment completed and education provided, see Patient Education Activity.  Items included in the education are:     proper positioning and latch    effectiveness of feeding    manual expression    handling and storing breastmilk    maintenance of breastfeeding for the first 6 months    sign/symptoms of infant feeding issues requiring referral to qualified health care provider  Postpartum care education provided, see Patient Education activity. Patient denies needs. Will monitor.  Mike Silva

## 2017-10-06 NOTE — PROGRESS NOTES
2017        DAILY NOTE - POSTPARTUM DAY 1    SUBJECTIVE: No complaints    Pain controlled? Yes  Tolerating a regular diet? YES  Ambulating? YES  Voiding without difficulty? Yes  Lochia? minimal  Breastfeeding:  yes      OBJECTIVE:  Vitals:    10/06/17 0103 10/06/17 0530 10/06/17 0808 10/06/17 0809   BP: 110/71 121/57  104/57   Pulse: 75 71  70   Resp:    Temp: 98.1  F (36.7  C) 98.6  F (37  C)  98.6  F (37  C)   TempSrc: Oral Oral  Oral   SpO2: 97% 97% 96%        Constitutional: healthy and alert, NAD    Abdomen:  Uterine fundus is firm, non-tender and at the level of the umbilicus     Extremeties:  Neg edema, non-tender    LABS:  Hemoglobin   Date Value Ref Range Status   10/05/2017 13.6 11.7 - 15.7 g/dL Final   2017 11.9 11.7 - 15.7 g/dL Final     Lab Results   Component Value Date    RUBELLAABIGG 18.3 2017      Lab Results   Component Value Date    ABO O 10/05/2017           Lab Results   Component Value Date    RH Pos 10/05/2017        ASSESSMENT:  Post-partum day #1    Normal spontaneous vaginal delivery  Doing well.  Normal healing wound.  No excessive bleeding     PLAN:   Anticipate discharge tomorrow.  Routine pp care.    Jose Trejo

## 2017-10-06 NOTE — PLAN OF CARE
Face to face report given with opportunity to observe patient.    Report given to Mike Whyte RN  10/6/2017  7:15 AM

## 2017-10-06 NOTE — L&D DELIVERY NOTE
"Delivery Summary    Diana Chavis MRN# [id number]   Age: 37 year old YOB: 1980     ASSESSMENT & PLAN: Routine postpartum care        Labor Event Times    Labor onset date:  10/5/17 Onset time:   2:00 PM            Labor Length    3rd Stage (hrs):  0 (min):  18      Labor Events     labor?:  No    steroids:  None   Labor Type:  Spontaneous   Predominate monitoring during 1st stage:  intermittent auscultation      Antibiotics received during labor?:  No      Rupture identifier:  Rupture 1   Rupture date/time: 10/5/17 1320   Rupture type:  Artificial Rupture of Membranes   Fluid color:  Clear   Fluid odor:  Normal      1:1 continuous labor support provided by?:  RN          Delivery/Placenta Date and Time    Delivery Date:  10/5/17 Delivery Time:  11:46 PM   Placenta Date/Time:  10/6/2017 12:04 AM   Oxytocin given at the time of delivery:  after delivery of baby      Vaginal Counts    Initial count performed by 2 team members:   Two Team Members   WINTER Morton S          Needles Suture [place] Sponges Instruments   Initial counts 1 0 15 8   Added to count 2 2 0    Final counts 2 2 15 8      Placed during labor Accounted for at the end of labor   NA NA   NA NA   NA NA      Final count performed by 2 team members:   Two Team Members   ANOOP Roberts CNM         Final count correct?:  Yes         Apgars    Living status:  Living    1 Minute 5 Minute 10 Minute 15 Minute 20 Minute   Skin color: 1  1       Heart rate: 2  2       Reflex irritability: 2  2       Muscle tone: 2  2       Respiratory effort: 1  2       Total: 8  9          Apgars assigned by:  RIGOBERTO      Cord    Vessels:  3 Vessels Complications:  Nuchal    Gases Sent?:  No         [place] Resuscitation    Methods:  None       Care at Delivery:  Viable  to moms abdomen      [place] Measurements    Weight:  7 lb 3.9 oz Length:  1' 7\"      Skin to Skin and Feeding Plan    Skin to skin initiation " date/time: 10/5/17 2346   Skin to skin with:  Mother   Skin to skin end date/time:     Breastfeeding initiated date/time:  10/6/2017 0000   How do you plan to feed your baby:  Breastfeeding      Labor Events and Shoulder Dystocia    Fetal Tracing Prior to Delivery:  Category 1   Shoulder dystocia present?:  Neg            Delivery (Maternal) (Provider to Complete) ([id number])    Episiotomy:  None   Perineal lacerations:  2nd Repaired?:  Yes   Vaginal laceration?:  No    Cervical laceration?:  No    Est. blood loss (mL):  350         Mother's Information  Mother: Diana Chavis #[id number]    Start of Mother's Information     IO Blood Loss  10/05/17 1400 - 10/06/17 0109    Mom's I/O Activity            End of Mother's Information  Mother: Diana Chavis #[id number]            Delivery - Provider to Complete ([id number])    Delivering clinician:  ERICA MEDINA   CNIRENE Care:  Exclusive CNM care in labor   Attempted Delivery Types (Choose all that apply):  Spontaneous Vaginal Delivery   Delivery Type (Choose the 1 that will go to the Birth History):  Vaginal, Spontaneous Delivery                           Placenta    Delayed Cord Clamping:  Done   Date/Time:  10/6/2017 12:04 AM   Removal:  Spontaneous   Disposition:  Hospital disposal      Anesthesia    Method:  None         Presentation and Position    Presentation:  Vertex   Position:  Left Occiput Anterior                R. Alejandra, 2nd with repair    MARYANN Myers CNIRENE

## 2017-10-06 NOTE — PROGRESS NOTES
Cervix: Dilation: 6/80%/-2  Amniotic bag palpable  Fetal Heart Rate Tracing: Cat 1  Contractions  1-7 minute   Comfort level Tolerating well    Assessment:   Amniotic bag palpable  5-6 cm.  Very stretchy cervix  Slow progress      Plan:   Discussed the option of AROM  Continue to monitor    MARYANN Tsang, ABRAHANM

## 2017-10-06 NOTE — PROGRESS NOTES
Cervix: Dilation: No exam  Fetal Heart Rate Tracing: Doppler.  145 with accel heard.  Contractions  Pt reports she is noticing some changes.  Comfort level Tolerating well with whirlpool, music, and relaxation techniques.      Assessment:   Tolerating labor well.      Plan:   Continue to monitor.      MARYANN Tsang, MARC

## 2017-10-06 NOTE — PLAN OF CARE
Problem: Postpartum (Vaginal Delivery) (Adult,Obstetrics,Pediatric)  Goal: Signs and Symptoms of Listed Potential Problems Will be Absent, Minimized or Managed (Postpartum)  Signs and symptoms of listed potential problems will be absent, minimized or managed by discharge/transition of care (reference Postpartum (Vaginal Delivery) (Adult,Obstetrics,Pediatric) CPG).   Outcome: Improving    10/06/17 1629   Postpartum (Vaginal Delivery)   Problems Assessed (Postpartum Vaginal Delivery) all   Problems Present (Postpartum Vag Deliv) none         Assessments completed as charted. B/P: 112/62, T: 98.4, P: 70, R: 16. Rates pain: 0/10. Voiding without difficulty. Fundus: Midline. Lochia: Light. Activity: unrestricted with out pain  and normal activity. Infant feeding: Breast feeding going well.      LATCH Score:   Latch: 1 - Repeated Attempts  Audible Swallowin - Few  Type of Nipple: (Breast/Nipple) 2 - Everted  Comfort: 2 - Soft, Nontender  Hold: 2 - No Assist   Total LATCH Score:   8     Postpartum breastfeeding assessment completed and education provided, see Patient Education Activity.  Items included in the education are:     proper positioning and latch    effectiveness of feeding    manual expression    handling and storing breastmilk    maintenance of breastfeeding for the first 6 months    sign/symptoms of infant feeding issues requiring referral to qualified health care provider  Postpartum care education provided, see Patient Education activity. Patient denies needs. Will monitor.  Mike Silva

## 2017-10-06 NOTE — PLAN OF CARE
Problem: Patient Care Overview  Goal: Plan of Care/Patient Progress Review  Outcome: Improving  Labor Shift Note  Data: See Flowsheets activity for contraction and fetal documentation.   Vitals:     10/05/17 1417 10/05/17 1915 10/05/17 2224   BP: 118/83 116/76 134/86   Pulse: 101 102 82   Resp: 16 16 16   Temp: 97.9  F (36.6  C) 98.5  F (36.9  C) 97.9  F (36.6  C)   TempSrc: Oral Oral Oral   SpO2: 97% 97% 98%   . Signs and symptoms of infection Absent.    Complications in labor: Absent. Support person Guy present.  Interventions: Continue uterine/fetal assessment per orders and nursing discretion. Vital Signs per order set. Comfort measures/pain control/labor management: Ambulation, hydration, position changes, birthing ball/sling and tub options to facilitate labor.  Plan: Anticipate . Provide labor/coping assistance as needed by patient and support person.  Observe for and notify care provider of indications of progressing labor, need for pain medications, or signs of fetal/maternal compromise. Patient desires natural birth without interventions, and requests no verbal pain assessments.

## 2017-10-06 NOTE — PLAN OF CARE
Problem: Postpartum (Vaginal Delivery) (Adult,Obstetrics,Pediatric)  Goal: Signs and Symptoms of Listed Potential Problems Will be Absent, Minimized or Managed (Postpartum)  Signs and symptoms of listed potential problems will be absent, minimized or managed by discharge/transition of care (reference Postpartum (Vaginal Delivery) (Adult,Obstetrics,Pediatric) CPG).   Outcome: Improving  Assessments completed as charted. B/P: 110/71, T: 98.1, P: 75, R: 16. Rates pain: 2/10. Voiding without difficulty. Fundus: Midline, U-2. Lochia: Light. Activity: normal activity. Infant feeding: Breast feeding going well.      LATCH Score:   Latch: 2 - Good Latch  Audible Swallowin - Spontaneous & frequent  Type of Nipple: (Breast/Nipple) 2 - Everted  Comfort: 2 - Soft, Nontender  Hold: 2 - No Assist   Total LATCH Score: 10     Postpartum breastfeeding assessment completed and education provided, see Patient Education Activity.  Items included in the education are:     proper positioning and latch    effectiveness of feeding    manual expression    handling and storing breastmilk    maintenance of breastfeeding for the first 6 months    sign/symptoms of infant feeding issues requiring referral to qualified health care provider  Postpartum care education provided, see Patient Education activity. Patient denies needs. Will monitor.  Mignon Whyte RN

## 2017-10-07 VITALS
RESPIRATION RATE: 16 BRPM | TEMPERATURE: 97.6 F | OXYGEN SATURATION: 97 % | HEART RATE: 76 BPM | SYSTOLIC BLOOD PRESSURE: 113 MMHG | DIASTOLIC BLOOD PRESSURE: 71 MMHG

## 2017-10-07 LAB
HGB BLD-MCNC: 12.7 G/DL (ref 11.7–15.7)
T PALLIDUM IGG+IGM SER QL: NEGATIVE

## 2017-10-07 PROCEDURE — 25000132 ZZH RX MED GY IP 250 OP 250 PS 637: Performed by: ADVANCED PRACTICE MIDWIFE

## 2017-10-07 PROCEDURE — 36415 COLL VENOUS BLD VENIPUNCTURE: CPT | Performed by: ADVANCED PRACTICE MIDWIFE

## 2017-10-07 PROCEDURE — 85018 HEMOGLOBIN: CPT | Performed by: ADVANCED PRACTICE MIDWIFE

## 2017-10-07 PROCEDURE — 72200001 ZZH LABOR CARE VAGINAL DELIVERY SINGLE

## 2017-10-07 RX ORDER — IBUPROFEN 800 MG/1
800 TABLET, FILM COATED ORAL EVERY 8 HOURS
Qty: 90 TABLET | Refills: 1 | Status: SHIPPED | OUTPATIENT
Start: 2017-10-07 | End: 2017-11-20

## 2017-10-07 RX ORDER — LANOLIN 100 %
OINTMENT (GRAM) TOPICAL
Qty: 40 G | Refills: 3 | Status: SHIPPED | OUTPATIENT
Start: 2017-10-07 | End: 2017-11-20

## 2017-10-07 RX ORDER — DOCUSATE SODIUM 100 MG/1
100 CAPSULE, LIQUID FILLED ORAL 2 TIMES DAILY
Qty: 40 CAPSULE | Refills: 1 | Status: SHIPPED | OUTPATIENT
Start: 2017-10-07 | End: 2017-11-20

## 2017-10-07 RX ADMIN — IBUPROFEN 800 MG: 800 TABLET ORAL at 00:04

## 2017-10-07 RX ADMIN — IBUPROFEN 800 MG: 800 TABLET ORAL at 08:47

## 2017-10-07 NOTE — PLAN OF CARE
Patient discharged at 1450 PM via ambulation accompanied by spouse and daughter and staff. Prescriptions sent to patients preferred pharmacy. All belongings sent with patient.     Discharge instructions reviewed with patient. Listed belongings gathered and returned to patient.    Patient discharged to home.     Core Measures and Vaccines  Core Measures applicable during stay: Yes  Pneumonia and Influenza given prior to discharge, if indicated: N/A    Surgical Patient   Surgical Procedures during stay: No  Did patient receive discharge instruction on wound care and recognition of infection symptoms? Yes    MISC  Follow up appointment made:  No the clinic is closed today, patient instructed to make follow-up appointment with Marcelo Roberts in 2 weeks and 6 weeks.  Home and hospital aquired medications returned to patient: N/A  Patient reports pain was well managed at discharge: Yes

## 2017-10-07 NOTE — PLAN OF CARE
Problem: Patient Care Overview  Goal: Individualization & Mutuality  Outcome: Improving  Assessments completed as charted. B/P: 113/71, T: 97.6, P: 76, R: 16. Rates pain: Denies pain but reports perineum being a little sore. Using motrin, tucks and tub baths with lavendar bath salts for discomfort. Voiding without difficulty. Fundus: Midline @U. Lochia: Light. Activity: normal activity. Infant feeding: Breast feeding going well.      LATCH Score:   Latch: 2 - Good Latch  Audible Swallowin - Spontaneous & frequent  Type of Nipple: (Breast/Nipple) 2 - Everted  Comfort: 2 - Soft, Nontender  Hold: 2 - No Assist   Total LATCH Score: 10     Postpartum breastfeeding assessment completed and education provided, see Patient Education Activity.  Items included in the education are:     proper positioning and latch    effectiveness of feeding    manual expression    handling and storing breastmilk    maintenance of breastfeeding for the first 6 months    sign/symptoms of infant feeding issues requiring referral to qualified health care provider  Postpartum care education provided, see Patient Education activity. Patient denies needs. Will monitor.  Kristina Diaz           Problem: Postpartum (Vaginal Delivery) (Adult,Obstetrics,Pediatric)  Goal: Signs and Symptoms of Listed Potential Problems Will be Absent, Minimized or Managed (Postpartum)  Signs and symptoms of listed potential problems will be absent, minimized or managed by discharge/transition of care (reference Postpartum (Vaginal Delivery) (Adult,Obstetrics,Pediatric) CPG).   Outcome: Improving    10/07/17 0856   Postpartum (Vaginal Delivery)   Problems Assessed (Postpartum Vaginal Delivery) all   Problems Present (Postpartum Vag Deliv) none

## 2017-10-07 NOTE — PROGRESS NOTES
[facility] Obstetrics Post-Partum Progress Note          Assessment and Plan:    Assessment:   Post-partum day #2  Normal spontaneous vaginal delivery  L&D complications: None      Doing well.      Plan:   Discharge later today           Interval History:   Doing well.  Pain is well-controlled.  No fevers.  No history of foul-smelling vaginal discharge.  Good appetite.  Denies chest pain, shortness of breath, nausea or vomiting.  Vaginal bleeding is similar to a heavy menstrual flow.  Ambulatory.  Breastfeeding well.            Significant Problems:    No past medical history on file.          Review of Systems:    The patient denies any chest pain, shortness of breath, excessive pain, fever, chills, purulent drainage from the wound, nausea or vomiting.          Medications:     All medications related to the patient's surgery have been reviewed  Current Facility-Administered Medications   Medication     NO Rho (D) immune globulin (RhoGam) needed - mother Rh POSITIVE     ibuprofen (ADVIL/MOTRIN) tablet 800 mg     acetaminophen (TYLENOL) tablet 325-650 mg     No MMR Needed - Assessment: Patient does not need MMR vaccine     No Tdap Needed - Assessment: Patient does not need Tdap vaccine     lanolin ointment     lactated ringers BOLUS 1,000 mL     oxytocin (PITOCIN) 30 units in 500 mL 0.9% NaCl infusion     oxytocin (PITOCIN) injection 10 Units     docusate sodium (COLACE) capsule 100 mg             Physical Exam:   All vitals stable  Temp: 97.6  F (36.4  C) Temp src: Oral BP: 113/71 Pulse: 76 Heart Rate: 68 Resp: 16 SpO2: 97 %        Uterine fundus is firm, non-tender and at the level of the umbilicus          Data:     All laboratory data related to this surgery reviewed  Hemoglobin   Date Value Ref Range Status   10/07/2017 12.7 11.7 - 15.7 g/dL Final   10/05/2017 13.6 11.7 - 15.7 g/dL Final   07/12/2017 11.9 11.7 - 15.7 g/dL Final   03/16/2017 13.6 11.7 - 15.7 g/dL Final     No imaging studies have been  John Coleman MD     Doing well       Discharge

## 2017-10-07 NOTE — PLAN OF CARE
Problem: Postpartum (Vaginal Delivery) (Adult,Obstetrics,Pediatric)  Goal: Signs and Symptoms of Listed Potential Problems Will be Absent, Minimized or Managed (Postpartum)  Signs and symptoms of listed potential problems will be absent, minimized or managed by discharge/transition of care (reference Postpartum (Vaginal Delivery) (Adult,Obstetrics,Pediatric) CPG).   Outcome: Improving  Assessments completed as charted. B/P: 110/64, T: 98.6, P: 76, R: 18. Rates pain: 0/10. Voiding without difficulty. Fundus: Midline Firm. Lochia: Light. Activity: unrestricted with out pain . Infant feeding: Breast feeding going well.      LATCH Score:   Latch: 2 - Good Latch  Audible Swallowin - Spontaneous & frequent  Type of Nipple: (Breast/Nipple) 2 - Everted  Comfort: 2 - Soft, Nontender  Hold: 2 - No Assist   Total LATCH Score: 10     Postpartum breastfeeding assessment completed and education provided, see Patient Education Activity.  Items included in the education are:     proper positioning and latch    effectiveness of feeding    manual expression    handling and storing breastmilk    maintenance of breastfeeding for the first 6 months    sign/symptoms of infant feeding issues requiring referral to qualified health care provider  Postpartum care education provided, see Patient Education activity. Patient denies needs. Will monitor.  Bing Patricia

## 2017-10-07 NOTE — DISCHARGE SUMMARY
[facility] Discharge Summary    Diana Chavis MRN# [id number]   Age: 37 year old YOB: 1980     Date of Admission:  10/5/2017  Date of Discharge::  10.7.2017  Admitting Physician:  MARYANN Francisco CN  Discharge Physician:  Rishi Coleman MD     Home clinic: [facility]          Admission Diagnoses:   Encounter for triage in pregnant patient  Normal labor          Discharge Diagnosis:   Normal spontaneous vaginal delivery  Intrauterine pregnancy at 40w 3 d   gestation          Procedures:   Procedure(s): No additional procedures performed       No procedures performed during this admission           Medications Prior to Admission:     Prescriptions Prior to Admission   Medication Sig Dispense Refill Last Dose     Blood Glucose Monitoring Suppl (ONETOUCH VERIO FLEX SYSTEM) W/DEVICE KIT 1 kit 4 times daily 1 kit 0 Past Week at Unknown time     blood glucose monitoring (ONE TOUCH VERIO IQ) test strip Use to test blood sugar 4 times daily. 200 each 5 Past Week at Unknown time     ONETOUCH DELICA LANCETS 33G MISC 1 each 4 times daily 100 each 4 Past Week at Unknown time     Omega-3 Fatty Acids (FISH OIL OMEGA-3 PO)    10/4/2017 at Unknown time     Prenatal Multivit-Min-Fe-FA (PRENATAL VITAMINS PO) Take 1 tablet by mouth daily   10/4/2017 at Unknown time             Discharge Medications:     Current Discharge Medication List      START taking these medications    Details   ibuprofen (ADVIL/MOTRIN) 800 MG tablet Take 1 tablet (800 mg) by mouth every 8 hours  Qty: 90 tablet, Refills: 1    Associated Diagnoses:  (spontaneous vaginal delivery)      docusate sodium (COLACE) 100 MG capsule Take 1 capsule (100 mg) by mouth 2 times daily  Qty: 40 capsule, Refills: 1    Associated Diagnoses:  (spontaneous vaginal delivery)      lanolin ointment Apply topically every hour as needed for dry skin (sore nipples)  Qty: 40 g, Refills: 3    Associated Diagnoses:  Sore nipples due to lactation         CONTINUE these medications which have NOT CHANGED    Details   Blood Glucose Monitoring Suppl (ONETOUCH VERIO FLEX SYSTEM) W/DEVICE KIT 1 kit 4 times daily  Qty: 1 kit, Refills: 0    Associated Diagnoses: Diet controlled gestational diabetes mellitus (GDM), antepartum      blood glucose monitoring (ONE TOUCH VERIO IQ) test strip Use to test blood sugar 4 times daily.  Qty: 200 each, Refills: 5    Associated Diagnoses: Diet controlled gestational diabetes mellitus (GDM), antepartum      ONETOUCH DELICA LANCETS 33G MISC 1 each 4 times daily  Qty: 100 each, Refills: 4    Associated Diagnoses: Diet controlled gestational diabetes mellitus (GDM), antepartum      Omega-3 Fatty Acids (FISH OIL OMEGA-3 PO)       Prenatal Multivit-Min-Fe-FA (PRENATAL VITAMINS PO) Take 1 tablet by mouth daily                   Consultations:   No consultations were requested during this admission          Brief History of Labor:       Normal labor    2 nd degree perineal laceration repaired  Normal post partum recovery       Hospital Course:   The patient's hospital course was unremarkable.  On discharge, her pain was well controlled Vaginal bleeding is similar to peak menstrual flow.  Voiding without difficulty.  Ambulating well and tolerating a normal diet.  No fever.  Breastfeeding well.  Infant is stable.  No bowel movement yet.*  She was discharged on post-partum day #2    Post-partum hemoglobin:   Hemoglobin   Date Value Ref Range Status   10/07/2017 12.7 11.7 - 15.7 g/dL Final             Discharge Instructions and Follow-Up:   Discharge diet: Regular   Discharge activity: Activity as tolerated   Discharge follow-up: Follow up with Marcelo Merrill  in 2 weeks   Wound care:            Discharge Disposition:   Discharged to home      Attestation:  I have reviewed today's vital signs, notes, medications, labs and imaging.    Rishi Coleman MD

## 2017-10-23 ENCOUNTER — PRENATAL OFFICE VISIT (OUTPATIENT)
Dept: OBGYN | Facility: OTHER | Age: 37
End: 2017-10-23
Attending: ADVANCED PRACTICE MIDWIFE
Payer: COMMERCIAL

## 2017-10-23 VITALS
SYSTOLIC BLOOD PRESSURE: 110 MMHG | DIASTOLIC BLOOD PRESSURE: 68 MMHG | BODY MASS INDEX: 25.69 KG/M2 | WEIGHT: 145 LBS | HEIGHT: 63 IN

## 2017-10-23 DIAGNOSIS — O99.810 ABNORMAL MATERNAL GLUCOSE TOLERANCE, ANTEPARTUM: ICD-10-CM

## 2017-10-23 PROCEDURE — 99207 ZZC NO CHARGE LOS: CPT | Performed by: ADVANCED PRACTICE MIDWIFE

## 2017-10-23 ASSESSMENT — PAIN SCALES - GENERAL: PAINLEVEL: NO PAIN (0)

## 2017-10-23 ASSESSMENT — PATIENT HEALTH QUESTIONNAIRE - PHQ9: SUM OF ALL RESPONSES TO PHQ QUESTIONS 1-9: 0

## 2017-10-23 ASSESSMENT — ANXIETY QUESTIONNAIRES
2. NOT BEING ABLE TO STOP OR CONTROL WORRYING: NOT AT ALL
IF YOU CHECKED OFF ANY PROBLEMS ON THIS QUESTIONNAIRE, HOW DIFFICULT HAVE THESE PROBLEMS MADE IT FOR YOU TO DO YOUR WORK, TAKE CARE OF THINGS AT HOME, OR GET ALONG WITH OTHER PEOPLE: NOT DIFFICULT AT ALL
GAD7 TOTAL SCORE: 2
7. FEELING AFRAID AS IF SOMETHING AWFUL MIGHT HAPPEN: NOT AT ALL
5. BEING SO RESTLESS THAT IT IS HARD TO SIT STILL: NOT AT ALL
4. TROUBLE RELAXING: NOT AT ALL
3. WORRYING TOO MUCH ABOUT DIFFERENT THINGS: SEVERAL DAYS
6. BECOMING EASILY ANNOYED OR IRRITABLE: SEVERAL DAYS
1. FEELING NERVOUS, ANXIOUS, OR ON EDGE: NOT AT ALL

## 2017-10-23 NOTE — MR AVS SNAPSHOT
After Visit Summary   10/23/2017    Diana Chavis    MRN: [id number]           Patient Information     Date Of Birth          1980        Visit Information        Provider Department      10/23/2017 2:00 PM Marcelo Roberts APRN CNM [facility] [place]        Today's Diagnoses     Abnormal maternal glucose tolerance, antepartum          Care Instructions    Return to office in 4 weeks for postpartum care.    Thank you for allowing Marcelo WOLF CNM and our OB team to participate in your care.  If you have a scheduling or an appointment question please contact [facility] Unit Coordinator at their direct line [phone].   ALL nursing questions or concerns can be directed to your OB nurse at: [phone] - Sary              Follow-ups after your visit        Your next 10 appointments already scheduled     Nov 20, 2017  2:00 PM CST   (Arrive by 1:45 PM)   Post Partum with MARYANN Francisco CNM   [facility] [place] [facility]    [address]  [place] [address]   [phone]              Who to contact     If you have questions or need follow up information about today's clinic visit or your schedule please contact [facility] directly at [phone].  Normal or non-critical lab and imaging results will be communicated to you by MyChart, letter or phone within 4 business days after the clinic has received the results. If you do not hear from us within 7 days, please contact the clinic through [employer] Medical[employer] or phone. If you have a critical or abnormal lab result, we will notify you by phone as soon as possible.  Submit refill requests through [employer] or call your pharmacy and they will forward the refill request to us. Please allow 3 business days for your refill to be completed.          Additional Information About Your Visit        [employer] Information     [employer] lets you send messages to your doctor, view your test results, renew your  "prescriptions, schedule appointments and more. To sign up, go to [email or web address] . Click on \"Log in\" on the left side of the screen, which will take you to the Welcome page. Then click on \"Sign up Now\" on the right side of the page.     You will be asked to enter the access code listed below, as well as some personal information. Please follow the directions to create your username and password.     Your access code is: [email or web address]  Expires: 12/10/2017  4:57 PM     Your access code will  in 90 days. If you need help or a new code, please call your [place] clinic or [phone].        Care EveryWhere ID     This is your Care EveryWhere ID. This could be used by other organizations to access your [place] medical records  [id number]        Your Vitals Were     Height Last Period BMI (Body Mass Index)             5' 3\" (1.6 m) 2016 (Exact Date) 25.69 kg/m2          Blood Pressure from Last 3 Encounters:   10/23/17 110/68   10/07/17 113/71   10/02/17 104/60    Weight from Last 3 Encounters:   10/23/17 145 lb (65.8 kg)   10/02/17 162 lb (73.5 kg)   17 160 lb (72.6 kg)              Today, you had the following     No orders found for display       Primary Care Provider    Physician No Ref-Primary       NO REF-PRIMARY PHYSICIAN        Equal Access to Services     [facility]: Hadii aad ku hadasho Soreaganali, waaxda luqadaha, qaybta kaalmada adeegyada, waxay sandy rees . So [facility] [phone].    ATENCIÓN: Si habla español, tiene a cuellar disposición servicios gratuitos de asistencia lingüística. Llame al [phone].    We comply with applicable federal civil rights laws and [place] laws. We do not discriminate on the basis of race, color, national origin, age, disability, sex, sexual orientation, or gender identity.            Thank you!     Thank you for choosing [facility] [place]  for your care. Our goal is always to provide you with excellent care. Hearing " back from our patients is one way we can continue to improve our services. Please take a few minutes to complete the written survey that you may receive in the mail after your visit with us. Thank you!             Your Updated Medication List - Protect others around you: Learn how to safely use, store and throw away your medicines at www.disposemymeds.org.          This list is accurate as of: 10/23/17  2:59 PM.  Always use your most recent med list.                   Brand Name Dispense Instructions for use Diagnosis    blood glucose monitoring test strip    ONE TOUCH VERIO IQ    200 each    Use to test blood sugar 4 times daily.    Diet controlled gestational diabetes mellitus (GDM), antepartum       docusate sodium 100 MG capsule    COLACE    40 capsule    Take 1 capsule (100 mg) by mouth 2 times daily     (spontaneous vaginal delivery)       FISH OIL OMEGA-3 PO           ibuprofen 800 MG tablet    ADVIL/MOTRIN    90 tablet    Take 1 tablet (800 mg) by mouth every 8 hours     (spontaneous vaginal delivery)       lanolin ointment     40 g    Apply topically every hour as needed for dry skin (sore nipples)    Sore nipples due to lactation       ONETOUCH DELICA LANCETS 33G Misc     100 each    1 each 4 times daily    Diet controlled gestational diabetes mellitus (GDM), antepartum       ONETOUCH VERIO FLEX SYSTEM W/DEVICE Kit     1 kit    1 kit 4 times daily    Diet controlled gestational diabetes mellitus (GDM), antepartum       PRENATAL VITAMINS PO      Take 1 tablet by mouth daily

## 2017-10-23 NOTE — NURSING NOTE
"Chief Complaint   Patient presents with     Post Partum Exam     2 wk PP        Initial /68 (BP Location: Left arm, Patient Position: Chair, Cuff Size: Adult Regular)  Ht 5' 3\" (1.6 m)  Wt 145 lb (65.8 kg)  LMP 12/26/2016 (Exact Date)  BMI 25.69 kg/m2 Estimated body mass index is 25.69 kg/(m^2) as calculated from the following:    Height as of this encounter: 5' 3\" (1.6 m).    Weight as of this encounter: 145 lb (65.8 kg).  Medication Reconciliation: mandy Lloyd      "

## 2017-10-23 NOTE — PROGRESS NOTES
"Diana Chavis is a 37 year old female  /68 (BP Location: Left arm, Patient Position: Chair, Cuff Size: Adult Regular)  Ht 5' 3\" (1.6 m)  Wt 145 lb (65.8 kg)  LMP 2016 (Exact Date)  BMI 25.69 kg/m2   Pleasant without acute distress.    Breast feeding:  y        Baby name: Annabel   : y Stitches: y  PHQ9:  0  GLENDY 7  Score 2  Bleeding:  Light to moderate rubra  Vulva:  \"good, a little sore yesterday\"  Family planning:  Condoms  Other concerns:  Anxiety, exerise     Assessment:    PP 2 weeks  Breastfeeding  Occasional Anxiety  Declines medication and/or counseling for anxiety    Plan:   Pt wants to try Tri-Plex (a vitamin supplement)  Exercise encouraged  Anxiety medication and counseling offered    RTO 4 wks for PP care and as needed    Greater than 10 were spent with this patient discussing anxiety treatment options, concerns with breastfeeding and medications.      Marcelo WOLF, ABRAHANM    "

## 2017-10-23 NOTE — PATIENT INSTRUCTIONS
Return to office in 4 weeks for postpartum care.    Thank you for allowing Marcelo WOLF CNM and our OB team to participate in your care.  If you have a scheduling or an appointment question please contact [facility] Unit Coordinator at their direct line [phone].   ALL nursing questions or concerns can be directed to your OB nurse at: [phone] - Sary

## 2017-10-24 ASSESSMENT — ANXIETY QUESTIONNAIRES: GAD7 TOTAL SCORE: 2

## 2017-11-20 ENCOUNTER — PRENATAL OFFICE VISIT (OUTPATIENT)
Dept: OBGYN | Facility: OTHER | Age: 37
End: 2017-11-20
Attending: ADVANCED PRACTICE MIDWIFE
Payer: COMMERCIAL

## 2017-11-20 VITALS
TEMPERATURE: 97.9 F | BODY MASS INDEX: 25.16 KG/M2 | OXYGEN SATURATION: 96 % | DIASTOLIC BLOOD PRESSURE: 68 MMHG | SYSTOLIC BLOOD PRESSURE: 108 MMHG | HEIGHT: 63 IN | WEIGHT: 142 LBS | HEART RATE: 95 BPM | RESPIRATION RATE: 20 BRPM

## 2017-11-20 DIAGNOSIS — Z12.4 SCREENING FOR CERVICAL CANCER: ICD-10-CM

## 2017-11-20 PROBLEM — O99.810 ABNORMAL MATERNAL GLUCOSE TOLERANCE, ANTEPARTUM: Status: RESOLVED | Noted: 2017-07-13 | Resolved: 2017-11-20

## 2017-11-20 PROCEDURE — 99000 SPECIMEN HANDLING OFFICE-LAB: CPT | Performed by: ADVANCED PRACTICE MIDWIFE

## 2017-11-20 PROCEDURE — 88142 CYTOPATH C/V THIN LAYER: CPT | Performed by: ADVANCED PRACTICE MIDWIFE

## 2017-11-20 PROCEDURE — 99207 ZZC POST PARTUM EXAM: CPT | Performed by: ADVANCED PRACTICE MIDWIFE

## 2017-11-20 PROCEDURE — 87624 HPV HI-RISK TYP POOLED RSLT: CPT | Mod: 90 | Performed by: ADVANCED PRACTICE MIDWIFE

## 2017-11-20 ASSESSMENT — ANXIETY QUESTIONNAIRES
2. NOT BEING ABLE TO STOP OR CONTROL WORRYING: NOT AT ALL
GAD7 TOTAL SCORE: 0
1. FEELING NERVOUS, ANXIOUS, OR ON EDGE: NOT AT ALL
7. FEELING AFRAID AS IF SOMETHING AWFUL MIGHT HAPPEN: NOT AT ALL
4. TROUBLE RELAXING: NOT AT ALL
5. BEING SO RESTLESS THAT IT IS HARD TO SIT STILL: NOT AT ALL
6. BECOMING EASILY ANNOYED OR IRRITABLE: NOT AT ALL
3. WORRYING TOO MUCH ABOUT DIFFERENT THINGS: NOT AT ALL

## 2017-11-20 ASSESSMENT — PAIN SCALES - GENERAL: PAINLEVEL: NO PAIN (0)

## 2017-11-20 ASSESSMENT — PATIENT HEALTH QUESTIONNAIRE - PHQ9: SUM OF ALL RESPONSES TO PHQ QUESTIONS 1-9: 0

## 2017-11-20 NOTE — MR AVS SNAPSHOT
"              After Visit Summary   11/20/2017    Diana Chavis    MRN: [id number]           Patient Information     Date Of Birth          1980        Visit Information        Provider Department      11/20/2017 2:00 PM [facility]        Today's Diagnoses     Routine postpartum follow-up    -  1    Screening for cervical cancer          Care Instructions    Return to office in one year for annual visit and as needed.    Thank you for allowing Marcelo WOLF CNM and our OB team to participate in your care.  If you have a scheduling or an appointment question please contact [facility] Unit Coordinator at their direct line [phone].   ALL nursing questions or concerns can be directed to your OB nurse at: [phone] - leah              Follow-ups after your visit        Who to contact     If you have questions or need follow up information about today's clinic visit or your schedule please contact [facility] directly at [phone].  Normal or non-critical lab and imaging results will be communicated to you by [employer] That Talkhart, letter or phone within 4 business days after the clinic has received the results. If you do not hear from us within 7 days, please contact the clinic through [employer] or phone. If you have a critical or abnormal lab result, we will notify you by phone as soon as possible.  Submit refill requests through [employer] or call your pharmacy and they will forward the refill request to us. Please allow 3 business days for your refill to be completed.          Additional Information About Your Visit        [employer] Information     [employer] lets you send messages to your doctor, view your test results, renew your prescriptions, schedule appointments and more. To sign up, go to [email or web address] . Click on \"Log in\" on the left side of the screen, which will take you to the Welcome page. Then click on \"Sign up Now\" on the right side of the " "page.     You will be asked to enter the access code listed below, as well as some personal information. Please follow the directions to create your username and password.     Your access code is: [email or web address]  Expires: 12/10/2017  3:57 PM     Your access code will  in 90 days. If you need help or a new code, please call your [place] clinic or [phone].        Care EveryWhere ID     This is your Care EveryWhere ID. This could be used by other organizations to access your [place] medical records  [id number]        Your Vitals Were     Pulse Temperature Respirations Height Last Period Pulse Oximetry    95 97.9  F (36.6  C) (Tympanic) 20 5' 3\" (1.6 m) 2016 (Exact Date) 96%    BMI (Body Mass Index)                   25.15 kg/m2            Blood Pressure from Last 3 Encounters:   17 108/68   10/23/17 110/68   10/07/17 113/71    Weight from Last 3 Encounters:   17 142 lb (64.4 kg)   10/23/17 145 lb (65.8 kg)   10/02/17 162 lb (73.5 kg)              We Performed the Following     A pap thin layer screen with  HPV - recommended age 30 - 65 years (select HPV order below)     HPV High Risk Types DNA Cervical        Primary Care Provider    Physician No Ref-Primary       NO REF-PRIMARY PHYSICIAN        Equal Access to Services     [facility] [place] : Hadii jame ku hadasho Soestela, waaxda luqadaha, qaybta kaalmada rashad, lauren rees . So [facility] [phone].    ATENCIÓN: Si habla español, tiene a cuellar disposición servicios gratuitos de asistencia lingüística. Llame al [phone].    We comply with applicable federal civil rights laws and [place] laws. We do not discriminate on the basis of race, color, national origin, age, disability, sex, sexual orientation, or gender identity.            Thank you!     Thank you for choosing [facility] [place]  for your care. Our goal is always to provide you with excellent care. Hearing back from our patients is one way we can " continue to improve our services. Please take a few minutes to complete the written survey that you may receive in the mail after your visit with us. Thank you!             Your Updated Medication List - Protect others around you: Learn how to safely use, store and throw away your medicines at www.disposemymeds.org.          This list is accurate as of: 11/20/17  3:00 PM.  Always use your most recent med list.                   Brand Name Dispense Instructions for use Diagnosis    FISH OIL OMEGA-3 PO           PRENATAL VITAMINS PO      Take 1 tablet by mouth daily

## 2017-11-20 NOTE — PROGRESS NOTES
"Diana Chavis is a 37 year old female is 6 weeks post partum  /68 (BP Location: Left arm, Patient Position: Chair, Cuff Size: Adult Regular)  Pulse 95  Temp 97.9  F (36.6  C) (Tympanic)  Resp 20  Ht 5' 3\" (1.6 m)  Wt 142 lb (64.4 kg)  LMP 12/26/2016 (Exact Date)  SpO2 96%  BMI 25.15 kg/m2  Pleasant without acute distress.    ROS:  CONSTITUTIONAL: NEGATIVE for fever, chills, change in weight  RESP: NEGATIVE for significant cough or SOB  CV: NEGATIVE for chest pain, palpitations or peripheral edema  GI: NEGATIVE for nausea, abdominal pain, heartburn, or change in bowel habits  : NEGATIVE for frequency, dysuria, hematuria, vaginal discharge  PSYCHIATRIC: NEGATIVE for changes in mood or affect    Breast feeding:  y        Baby name: Annabel   Spontaneous vaginal delivery: y Stitches: y   PHQ9:  0  Bleeding:  none  Vulva:  \"good\"  Family planning:  Condoms  Other concerns:  Positioning for breast feeding so back isn't sore.    Pelvic:  Vagina and vulva are normal; well healed, no discharge is noted.    Cervix: normal without lesions.    Uterus:  Freely mobile, firm, normal in size and shape without tenderness.  Adnexa: without masses or tenderness.    Assessment:    PP 6 weeks  Need for cervical cancer screening  Family planning    Plan:   Pelvic exam  Pap with HPV  Condoms    RTO in one year for annual visit and as needed.      Greater than 20 were spent with this patient  MARYANN Tsang, ABRAHANM      "

## 2017-11-20 NOTE — PATIENT INSTRUCTIONS
Return to office in one year for annual visit and as needed.    Thank you for allowing Marcelo WOLF CNM and our OB team to participate in your care.  If you have a scheduling or an appointment question please contact [facility] Unit Coordinator at their direct line [phone].   ALL nursing questions or concerns can be directed to your OB nurse at: [phone] - Sary

## 2017-11-20 NOTE — NURSING NOTE
"Chief Complaint   Patient presents with     Post Partum Exam     6 wk        Initial /68 (BP Location: Left arm, Patient Position: Chair, Cuff Size: Adult Regular)  Pulse 95  Temp 97.9  F (36.6  C) (Tympanic)  Resp 20  Ht 5' 3\" (1.6 m)  Wt 142 lb (64.4 kg)  LMP 12/26/2016 (Exact Date)  SpO2 96%  BMI 25.15 kg/m2 Estimated body mass index is 25.15 kg/(m^2) as calculated from the following:    Height as of this encounter: 5' 3\" (1.6 m).    Weight as of this encounter: 142 lb (64.4 kg).  Medication Reconciliation: mandy Lloyd      "

## 2017-11-21 ASSESSMENT — ANXIETY QUESTIONNAIRES: GAD7 TOTAL SCORE: 0

## 2017-11-28 LAB
COPATH REPORT: NORMAL
PAP: NORMAL

## 2017-11-29 LAB
FINAL DIAGNOSIS: NORMAL
HPV HR 12 DNA CVX QL NAA+PROBE: NEGATIVE
HPV16 DNA SPEC QL NAA+PROBE: NEGATIVE
HPV18 DNA SPEC QL NAA+PROBE: NEGATIVE
SPECIMEN DESCRIPTION: NORMAL

## 2018-01-02 ENCOUNTER — OFFICE VISIT (OUTPATIENT)
Dept: CARE COORDINATION | Facility: OTHER | Age: 38
End: 2018-01-02
Attending: PODIATRIST
Payer: COMMERCIAL

## 2018-01-02 DIAGNOSIS — L60.3 NAIL ATROPHY: Primary | ICD-10-CM

## 2018-01-02 LAB
KOH PREP SPEC: NORMAL
Lab: NORMAL
SPECIMEN SOURCE: NORMAL

## 2018-01-02 PROCEDURE — 87220 TISSUE EXAM FOR FUNGI: CPT | Performed by: PODIATRIST

## 2018-01-02 NOTE — NURSING NOTE
Dr. Castellanos in to see patient. No assistance needed.  Nail specimen sent to lab    Supplies:    N/A

## 2018-01-02 NOTE — MR AVS SNAPSHOT
"              After Visit Summary   2018    Diana Chavis    MRN: [id number]           Patient Information     Date Of Birth          1980        Visit Information        Provider Department      2018 3:30 PM Rylee Castellanos DPM [facility] [place]         Follow-ups after your visit        Who to contact     If you have questions or need follow up information about today's clinic visit or your schedule please [facility] directly at [phone].  Normal or non-critical lab and imaging results will be communicated to you by [employer]er[employer], letter or phone within 4 business days after the clinic has received the results. If you do not hear from us within 7 days, please contact the clinic through [employer] or phone. If you have a critical or abnormal lab result, we will notify you by phone as soon as possible.  Submit refill requests through [employer] or call your pharmacy and they will forward the refill request to us. Please allow 3 business days for your refill to be completed.          Additional Information About Your Visit        [employer] Information     CRAiLAR lets you send messages to your doctor, view your test results, renew your prescriptions, schedule appointments and more. To sign up, go to [email or web address] . Click on \"Log in\" on the left side of the screen, which will take you to the Welcome page. Then click on \"Sign up Now\" on the right side of the page.     You will be asked to enter the access code listed below, as well as some personal information. Please follow the directions to create your username and password.     Your access code is: [email or web address]  Expires: 4/15/2018  9:01 AM     Your access code will  in 90 days. If you need help or a new code, please call your [place] clinic or [phone].        Care EveryWhere ID     This is your Care EveryWhere ID. This could be used by other organizations to access your [place] medical records  [id number]   "       Blood Pressure from Last 3 Encounters:   11/20/17 108/68   10/23/17 110/68   10/07/17 113/71    Weight from Last 3 Encounters:   11/20/17 64.4 kg (142 lb)   10/23/17 65.8 kg (145 lb)   10/02/17 73.5 kg (162 lb)              Today, you had the following     No orders found for display       Primary Care Provider Fax #    Physician No Ref-Primary [phone]       No address on file        Equal Access to Services     [facility] : Hadii aad ku hadasho Soomaali, waaxda luqadaha, qaybta kaalmada adeegyada, waxay moin chely rees . So [facility] [phone].    ATENCIÓN: Si habla español, tiene a cuellar disposición servicios gratuitos de asistencia lingüística. Llame al [phone].    We comply with applicable federal civil rights laws and [place] laws. We do not discriminate on the basis of race, color, national origin, age, disability, sex, sexual orientation, or gender identity.            Thank you!     Thank you for [facility]  for your [facility]. Our goal is always to provide you with excellent care. Hearing back from our patients is one way we can continue to improve our services. Please take a few minutes to complete the written survey that you may receive in the mail after your visit with us. Thank you!             Your Updated Medication List - Protect others around you: Learn how to safely use, store and throw away your medicines at www.disposemymeds.org.          This list is accurate as of: 1/2/18 11:59 PM.  Always use your most recent med list.                   Brand Name Dispense Instructions for use Diagnosis    FISH OIL OMEGA-3 PO           PRENATAL VITAMINS PO      Take 1 tablet by mouth daily

## 2018-11-29 ENCOUNTER — OFFICE VISIT (OUTPATIENT)
Dept: OBGYN | Facility: OTHER | Age: 38
End: 2018-11-29
Attending: ADVANCED PRACTICE MIDWIFE
Payer: COMMERCIAL

## 2018-11-29 VITALS
DIASTOLIC BLOOD PRESSURE: 60 MMHG | SYSTOLIC BLOOD PRESSURE: 104 MMHG | HEIGHT: 63 IN | BODY MASS INDEX: 23.04 KG/M2 | WEIGHT: 130 LBS

## 2018-11-29 DIAGNOSIS — Z12.4 ENCOUNTER FOR SCREENING FOR CERVICAL CANCER: ICD-10-CM

## 2018-11-29 DIAGNOSIS — Z01.419 WELL WOMAN EXAM WITH ROUTINE GYNECOLOGICAL EXAM: Primary | ICD-10-CM

## 2018-11-29 LAB
ALBUMIN UR-MCNC: NEGATIVE MG/DL
APPEARANCE UR: CLEAR
BILIRUB UR QL STRIP: NEGATIVE
COLOR UR AUTO: YELLOW
GLUCOSE UR STRIP-MCNC: NEGATIVE MG/DL
HGB UR QL STRIP: NEGATIVE
KETONES UR STRIP-MCNC: NEGATIVE MG/DL
LEUKOCYTE ESTERASE UR QL STRIP: NEGATIVE
NITRATE UR QL: NEGATIVE
PH UR STRIP: 6 PH (ref 5–7)
SOURCE: NORMAL
SP GR UR STRIP: 1.02 (ref 1–1.03)
UROBILINOGEN UR STRIP-ACNC: 0.2 EU/DL (ref 0.2–1)

## 2018-11-29 PROCEDURE — 99395 PREV VISIT EST AGE 18-39: CPT | Performed by: ADVANCED PRACTICE MIDWIFE

## 2018-11-29 PROCEDURE — 87624 HPV HI-RISK TYP POOLED RSLT: CPT | Mod: 90 | Performed by: ADVANCED PRACTICE MIDWIFE

## 2018-11-29 PROCEDURE — 88142 CYTOPATH C/V THIN LAYER: CPT | Performed by: ADVANCED PRACTICE MIDWIFE

## 2018-11-29 PROCEDURE — 81003 URINALYSIS AUTO W/O SCOPE: CPT | Performed by: ADVANCED PRACTICE MIDWIFE

## 2018-11-29 PROCEDURE — 99000 SPECIMEN HANDLING OFFICE-LAB: CPT | Performed by: ADVANCED PRACTICE MIDWIFE

## 2018-11-29 ASSESSMENT — ANXIETY QUESTIONNAIRES
GAD7 TOTAL SCORE: 0
6. BECOMING EASILY ANNOYED OR IRRITABLE: NOT AT ALL
7. FEELING AFRAID AS IF SOMETHING AWFUL MIGHT HAPPEN: NOT AT ALL
5. BEING SO RESTLESS THAT IT IS HARD TO SIT STILL: NOT AT ALL
1. FEELING NERVOUS, ANXIOUS, OR ON EDGE: NOT AT ALL
3. WORRYING TOO MUCH ABOUT DIFFERENT THINGS: NOT AT ALL
4. TROUBLE RELAXING: NOT AT ALL
2. NOT BEING ABLE TO STOP OR CONTROL WORRYING: NOT AT ALL

## 2018-11-29 ASSESSMENT — PATIENT HEALTH QUESTIONNAIRE - PHQ9: SUM OF ALL RESPONSES TO PHQ QUESTIONS 1-9: 1

## 2018-11-29 ASSESSMENT — PAIN SCALES - GENERAL: PAINLEVEL: NO PAIN (0)

## 2018-11-29 NOTE — PATIENT INSTRUCTIONS
Return to office in one year for well woman care and as needed.    Thank you for allowing Marcelo WOLF CNM and our OB team to participate in your care.  If you have a scheduling or an appointment question please contact [facility] Unit Coordinator at their direct line [phone].   ALL nursing questions or concerns can be directed to your OB nurse at: [phone] Sabiha Okeefe/Wilma

## 2018-11-29 NOTE — PROGRESS NOTES
ANNUAL    Diana is a 38 year old  female who presents for annual exam.   Youngest child 13 months  Largest Child 8 lb 6 oz  GDM y, diet controlled  Hypertension n  No LMP recorded.  Menses:  Cycles Amenorrhea with breastfeeding When did they start 16 How many days bleed na pain na Contraception Condoms.  Planning pregnancy: n  Concerns in addition to routine health maintenance?  Possible UTI.  GYNECOLOGIC HISTORY:   Surgery: n  History sexually transmitted infections:No STD history   Safe?  y  STI testing offered?  y  History of abnormal Pap smear: n  Problems with sex? (bleeding/pain)n  Family history of: breast cancer: n   Uterine cancer: n ovarian cancer: n   colon cancer: n    HEALTH MAINTENANCE:  Cholesterol: (No results found for: CHOL History of abnormal lipids: n  Mammo: n . History of abnormal Mammo:na   Regular Self Breast Exams: n Calcium/Vitamin D or Vitamin: y Exercise n    TSH: (No results found for: TSH )  Pap; (  Lab Results   Component Value Date    PAP NIL 2017    )    HISTORY:  Obstetric History       T4      L4     SAB1   TAB0   Ectopic0   Multiple0   Live Births4       # Outcome Date GA Lbr Leonid/2nd Weight Sex Delivery Anes PTL Lv   5 Term 10/05/17 40w3d 09:44 / 00:02 7 lb 3.9 oz (3.286 kg) F Vag-Spont None N JEREMY      Name: LELO FLOREZ      Apgar1:  8                Apgar5: 9   4 SAB 16 12w2d          3 Term 01/04/15 39w4d  8 lb 12 oz (3.969 kg) M Vag-Spont   JEREMY      Name: Trevin   2 Term 12 39w5d  7 lb 10 oz (3.459 kg) F Vag-Spont   JEREMY      Name: Darling   1 Term 12/31/10 40w0d  6 lb 14 oz (3.118 kg) F Vag-Spont   JEREMY      Name: Sowmya        History reviewed. No pertinent past medical history.  Past Surgical History:   Procedure Laterality Date     EXTRACTION(S) DENTAL      [place] teeth extraction      Family History   Problem Relation Age of Onset     Hypertension Father      Asthma Father      Heart Disease Maternal Grandmother       "Cerebrovascular Disease Maternal Grandmother      Diabetes Maternal Grandmother      Lung Cancer Paternal Grandfather      Tuberculosis Paternal Grandfather      Migraines Paternal Grandmother      Thyroid Disease Paternal Aunt      Down Syndrome Maternal Uncle      Diabetes Maternal Aunt      Migraines Maternal Aunt      Social History     Social History     Marital status:      Spouse name: N/A     Number of children: N/A     Years of education: N/A     Social History Main Topics     Smoking status: Never Smoker     Smokeless tobacco: Never Used     Alcohol use No     Drug use: None     Sexual activity: Not Asked     Other Topics Concern     None     Social History Narrative       Current Outpatient Prescriptions:      Multiple Vitamins-Minerals (MULTIVITAMIN ADULT PO), , Disp: , Rfl:      Omega-3 Fatty Acids (FISH OIL OMEGA-3 PO), , Disp: , Rfl:    No Known Allergies    Past medical, surgical, social and family history were reviewed and updated in [facility].    ROS:    CONSTITUTIONAL:     NEGATIVE for fever, chills, change in weight  INTEGUMENTARY/SKIN:       NEGATIVE for worrisome rashes, moles or lesions  EYES:     NEGATIVE for vision changes or irritation  ENT/MOUTH: NEGATIVE for ear, mouth and throat problems  RESP:     NEGATIVE for significant cough or SOB  CV:   NEGATIVE for chest pain, palpitations or peripheral edema  GI:     NEGATIVE for nausea, abdominal pain, heartburn, or change in bowel habits  :   NEGATIVE for frequency, dysuria, hematuria, vaginal discharge, or irregular bleeding,incontinence Urine darker.  MUSCULOSKELETAL:     NEGATIVE for significant arthralgias or myalgia  NEURO:      NEGATIVE for weakness, dizziness or paresthesias  ENDOCRINE:      NEGATIVE for temperature intolerance, skin/hair changes  PSYCHIATRIC:      NEGATIVE for changes in mood or affect.     EXAM:   /60 (BP Location: Right arm, Patient Position: Chair, Cuff Size: Adult Regular)  Ht 5' 3\" (1.6 m)  Wt 130 lb " (59 kg)  BMI 23.03 kg/m2   BMI: Body mass index is 23.03 kg/(m^2).  Constitutional: healthy, alert and no distress  Head: Normocephalic. No masses, lesions, tenderness or abnormalities  Neck: Neck supple. Trachea midline. No adenopathy. Thyroid symmetric, normal size.   Cardiovascular: RRR.   Respiratory: lungs clear   Breast: Breasts reveal mild symmetric fibrocystic densities, but there are no dominant, discrete, fixed or suspicious masses found.  Gastrointestinal: Abdomen soft, non-tender, non-distended. No masses, organomegaly.  :  Vulva:  No external lesions, normal female hair distribution, no inguinal adenopathy.    Urethra:  Midline, non-tender, well supported, no discharge  Vagina:  Moist, pink, no abnormal discharge, no lesions  Cervix: clean   Uterus:  Normal size, non-tender, freely mobile  Ovaries:  No masses appreciated  Rectal Exam: deferred  Musculoskeletal: extremities normal  Skin: no suspicious lesions or rashes  Psychiatric: Affect appropriate, cooperative,mentation appears normal.     COUNSELING:   regular exercise  healthy diet/nutrition  Immunizations   contraception  family planning  Folic Acid Counseling   reports that she has never smoked. She has never used smokeless tobacco.  Tobacco Cessation Action Plan: na  Body mass index is 23.03 kg/(m^2).  Weight management plan: Continue healthy diet and activity  FRAX Risk Assessment    ASSESSMENT:  38 year old female with satisfactory annual exam  Need of cervical cancer screening  Family planning  Recently quit breastfeeding 13 month old  Darker urine  Declines influenza vaccination with education  PLAN:   Pap with High Risk hpv  Condoms  UA with micro reflex to culture  Offered flu vaccine    Return to office: 1 year for well woman visit and as needed    Total visit greater than 30 minutes with 25 minutes spent discussing routine well woman care and prevention    MARYANN Tsang, CNM

## 2018-11-29 NOTE — NURSING NOTE
"Chief Complaint   Patient presents with     Gyn Exam       Initial /60 (BP Location: Right arm, Patient Position: Chair, Cuff Size: Adult Regular)  Ht 5' 3\" (1.6 m)  Wt 130 lb (59 kg)  BMI 23.03 kg/m2 Estimated body mass index is 23.03 kg/(m^2) as calculated from the following:    Height as of this encounter: 5' 3\" (1.6 m).    Weight as of this encounter: 130 lb (59 kg).  Medication Reconciliation: complete    Sary Lloyd LPN    "

## 2018-11-29 NOTE — MR AVS SNAPSHOT
"              After Visit Summary   11/29/2018    Diana Chavis    MRN: [id number]           Patient Information     Date Of Birth          1980        Visit Information        Provider Department      11/29/2018 2:00 PM Marcelo [facility] APRN CNM [facility]        Today's Diagnoses     Well woman exam with routine gynecological exam    -  1    Encounter for screening for cervical cancer           Care Instructions    Return to office in one year for well woman care and as needed.    Thank you for allowing Marcelo WOLF CNM and our OB team to participate in your care.  If you have a scheduling or an appointment question please contact [facility] Unit Coordinator at their direct line [phone].   ALL nursing questions or concerns can be directed to your OB nurse at: [phone] Sabiha Okeefe/Wilma               Follow-ups after your visit        Who to contact     If you have questions or need follow up information about today's clinic visit or your schedule please contact [facility] directly at [phone].  Normal or non-critical lab and imaging results will be communicated to you by MyChart, letter or phone within 4 business days after the clinic has received the results. If you do not hear from us within 7 days, please contact the clinic through MyChart or phone. If you have a critical or abnormal lab result, we will notify you by phone as soon as possible.  Submit refill requests through [facility] or call your pharmacy and they will forward the refill request to us. Please allow 3 business days for your refill to be completed.          Additional Information About Your Visit        Care EveryWhere ID     This is your Care EveryWhere ID. This could be used by other organizations to access your [place] medical records  [id number]        Your Vitals Were     Height BMI (Body Mass Index)                5' 3\" (1.6 m) 23.03 kg/m2           Blood Pressure from " Last 3 Encounters:   11/29/18 104/60   11/20/17 108/68   10/23/17 110/68    Weight from Last 3 Encounters:   11/29/18 130 lb (59 kg)   11/20/17 142 lb (64.4 kg)   10/23/17 145 lb (65.8 kg)              We Performed the Following     *UA reflex to Microscopic and Culture - [facility]     A pap thin layer screen with  HPV - recommended age 30 - 65 years (select HPV order below)     HPV High Risk Types DNA Cervical        Primary Care Provider Fax #    Physician No Ref-Primary [phone]       No address on file        Equal Access to Services     [facility] Farm[facility] : Hadkriss Shell, kevin newberry, jayda solorzano, lauren rees . So [facility] [phone].    ATENCIÓN: Si habla español, tiene a cuellar disposición servicios gratuitos de asistencia lingüística. Llame al [phone].    We comply with applicable federal civil rights laws and [place] laws. We do not discriminate on the basis of race, color, national origin, age, disability, sex, sexual orientation, or gender identity.            Thank you!     Thank you for choosing [facility]  for your care. Our goal is always to provide you with excellent care. Hearing back from our patients is one way we can continue to improve our services. Please take a few minutes to complete the written survey that you may receive in the mail after your visit with us. Thank you!             Your Updated Medication List - Protect others around you: Learn how to safely use, store and throw away your medicines at www.disposemymeds.org.          This list is accurate as of 11/29/18  3:27 PM.  Always use your most recent med list.                   Brand Name Dispense Instructions for use Diagnosis    FISH OIL OMEGA-3 PO           MULTIVITAMIN ADULT PO

## 2018-11-30 ASSESSMENT — ANXIETY QUESTIONNAIRES: GAD7 TOTAL SCORE: 0

## 2018-12-06 LAB
COPATH REPORT: NORMAL
PAP: NORMAL

## 2018-12-07 LAB
FINAL DIAGNOSIS: NORMAL
HPV HR 12 DNA CVX QL NAA+PROBE: NEGATIVE
HPV16 DNA SPEC QL NAA+PROBE: NEGATIVE
HPV18 DNA SPEC QL NAA+PROBE: NEGATIVE
SPECIMEN DESCRIPTION: NORMAL
SPECIMEN SOURCE CVX/VAG CYTO: NORMAL

## 2020-01-27 ENCOUNTER — APPOINTMENT (OUTPATIENT)
Dept: LAB | Facility: OTHER | Age: 40
End: 2020-01-27
Attending: ADVANCED PRACTICE MIDWIFE
Payer: COMMERCIAL

## 2020-01-27 ENCOUNTER — OFFICE VISIT (OUTPATIENT)
Dept: OBGYN | Facility: OTHER | Age: 40
End: 2020-01-27
Attending: ADVANCED PRACTICE MIDWIFE
Payer: COMMERCIAL

## 2020-01-27 VITALS
SYSTOLIC BLOOD PRESSURE: 112 MMHG | WEIGHT: 127 LBS | HEART RATE: 60 BPM | BODY MASS INDEX: 22.5 KG/M2 | HEIGHT: 63 IN | DIASTOLIC BLOOD PRESSURE: 82 MMHG

## 2020-01-27 DIAGNOSIS — Z01.419 WELL WOMAN EXAM WITH ROUTINE GYNECOLOGICAL EXAM: Primary | ICD-10-CM

## 2020-01-27 DIAGNOSIS — M25.521 RIGHT ELBOW PAIN: ICD-10-CM

## 2020-01-27 DIAGNOSIS — Z12.4 ENCOUNTER FOR SCREENING FOR CERVICAL CANCER: ICD-10-CM

## 2020-01-27 PROBLEM — Z34.83 ENCOUNTER FOR SUPERVISION OF OTHER NORMAL PREGNANCY, THIRD TRIMESTER: Status: RESOLVED | Noted: 2017-06-20 | Resolved: 2020-01-27

## 2020-01-27 PROBLEM — Z36.89 ENCOUNTER FOR TRIAGE IN PREGNANT PATIENT: Status: RESOLVED | Noted: 2017-10-05 | Resolved: 2020-01-27

## 2020-01-27 LAB
ERYTHROCYTE [DISTWIDTH] IN BLOOD BY AUTOMATED COUNT: 13 % (ref 10–15)
HCT VFR BLD AUTO: 40.5 % (ref 35–47)
HGB BLD-MCNC: 13.9 G/DL (ref 11.7–15.7)
MCH RBC QN AUTO: 30.6 PG (ref 26.5–33)
MCHC RBC AUTO-ENTMCNC: 34.3 G/DL (ref 31.5–36.5)
MCV RBC AUTO: 89 FL (ref 78–100)
PLATELET # BLD AUTO: 225 10E9/L (ref 150–450)
RBC # BLD AUTO: 4.54 10E12/L (ref 3.8–5.2)
WBC # BLD AUTO: 7.1 10E9/L (ref 4–11)

## 2020-01-27 PROCEDURE — G0123 SCREEN CERV/VAG THIN LAYER: HCPCS | Performed by: ADVANCED PRACTICE MIDWIFE

## 2020-01-27 PROCEDURE — 87624 HPV HI-RISK TYP POOLED RSLT: CPT | Performed by: ADVANCED PRACTICE MIDWIFE

## 2020-01-27 PROCEDURE — 85027 COMPLETE CBC AUTOMATED: CPT | Performed by: ADVANCED PRACTICE MIDWIFE

## 2020-01-27 PROCEDURE — 99395 PREV VISIT EST AGE 18-39: CPT | Performed by: ADVANCED PRACTICE MIDWIFE

## 2020-01-27 PROCEDURE — 36415 COLL VENOUS BLD VENIPUNCTURE: CPT | Performed by: ADVANCED PRACTICE MIDWIFE

## 2020-01-27 ASSESSMENT — MIFFLIN-ST. JEOR: SCORE: 1230.2

## 2020-01-27 ASSESSMENT — PAIN SCALES - GENERAL: PAINLEVEL: NO PAIN (0)

## 2020-01-27 NOTE — NURSING NOTE
"Chief Complaint   Patient presents with     Gyn Exam       Initial /82   Pulse 60   Ht 1.6 m (5' 3\")   Wt 57.6 kg (127 lb)   BMI 22.50 kg/m   Estimated body mass index is 22.5 kg/m  as calculated from the following:    Height as of this encounter: 1.6 m (5' 3\").    Weight as of this encounter: 57.6 kg (127 lb).  Medication Reconciliation: complete  Wilma Nava LPN'  "

## 2020-01-27 NOTE — PROGRESS NOTES
ANNUAL    Diana is a 39 year old  female who presents for annual exam.   Youngest child 2  Largest Child 8+ lb  GDM one time/diet controlled  Hypertension n  No LMP recorded.  Menses:  Cycles 28 days When did they start 16 How many days bleed 5 days with 2-3 heavy pain n Contraception Condoms.  Planning pregnancy: n  Concerns in addition to routine health maintenance?  Sore right elbow.  GYNECOLOGIC HISTORY:   Surgery: n  History sexually transmitted infections:No STD history  Safe?  y  STI testing offered?  y  History of abnormal Pap smear: n  Problems with sex? (bleeding/pain) n  Family history of: breast cancer: n   Uterine cancer: n ovarian cancer: n   colon cancer: n    HEALTH MAINTENANCE:  Cholesterol: (No results found for: CHOL History of abnormal lipids: n  Mammo: n . History of abnormal Mammo:na   Regular Self Breast Exams: n Calcium/Vitamin D or Vitamin: y Exercise n    TSH: (No results found for: TSH )  Pap; (  Lab Results   Component Value Date    PAP NIL 2018    PAP NIL 2017    )    HISTORY:  OB History    Para Term  AB Living   5 4 4 0 1 4   SAB TAB Ectopic Multiple Live Births   1 0 0 0 4      # Outcome Date GA Lbr Leonid/2nd Weight Sex Delivery Anes PTL Lv   5 Term 10/05/17 40w3d 09:44 / 00:02 3.286 kg (7 lb 3.9 oz) F Vag-Spont None N JEREMY      Name: LELO FLOREZ      Apgar1: 8  Apgar5: 9   4 SAB / 12w2d          3 Term 01/04/15 39w4d  3.969 kg (8 lb 12 oz) M Vag-Spont   JEREMY      Name: Trevin   2 Term 12 39w5d  3.459 kg (7 lb 10 oz) F Vag-Spont   JEREMY      Name: Darling   1 Term 12/31/10 40w0d  3.118 kg (6 lb 14 oz) F Vag-Spont   JEREMY      Name: Sowmya     No past medical history on file.  Past Surgical History:   Procedure Laterality Date     EXTRACTION(S) DENTAL      [place] teeth extraction      Family History   Problem Relation Age of Onset     Hypertension Father      Asthma Father      Heart Disease Maternal Grandmother      Cerebrovascular  Disease Maternal Grandmother      Diabetes Maternal Grandmother      Lung Cancer Paternal Grandfather      Tuberculosis Paternal Grandfather      Migraines Paternal Grandmother      Thyroid Disease Paternal Aunt      Down Syndrome Maternal Uncle      Diabetes Maternal Aunt      Migraines Maternal Aunt      Social History     Socioeconomic History     Marital status:      Spouse name: None     Number of children: None     Years of education: None     Highest education level: None   Occupational History     None   Social Needs     Financial resource strain: None     Food insecurity:     Worry: None     Inability: None     Transportation needs:     Medical: None     Non-medical: None   Tobacco Use     Smoking status: Never Smoker     Smokeless tobacco: Never Used   Substance and Sexual Activity     Alcohol use: No     Drug use: None     Sexual activity: None   Lifestyle     Physical activity:     Days per week: None     Minutes per session: None     Stress: None   Relationships     Social connections:     Talks on phone: None     Gets together: None     Attends Jain service: None     Active member of club or organization: None     Attends meetings of clubs or organizations: None     Relationship status: None     Intimate partner violence:     Fear of current or ex partner: None     Emotionally abused: None     Physically abused: None     Forced sexual activity: None   Other Topics Concern     Parent/sibling w/ CABG, MI or angioplasty before 65F 55M? Not Asked   Social History Narrative     None       Current Outpatient Medications:      Multiple Vitamins-Minerals (MULTIVITAMIN ADULT PO), , Disp: , Rfl:    No Known Allergies    Past medical, surgical, social and family history were reviewed and updated in [facility].    ROS:    CONSTITUTIONAL:     NEGATIVE for fever, chills, change in weight  INTEGUMENTARY/SKIN:       NEGATIVE for worrisome rashes, moles or lesions  EYES:     NEGATIVE for vision changes or  "irritation  ENT/MOUTH: NEGATIVE for ear, mouth and throat problems  RESP:     NEGATIVE for significant cough or SOB  CV:   NEGATIVE for chest pain, palpitations or peripheral edema  GI:     NEGATIVE for nausea, abdominal pain, heartburn, or change in bowel habits  :   NEGATIVE for frequency, dysuria, hematuria, vaginal discharge, or irregular bleeding,incontinence   MUSCULOSKELETAL:     NEGATIVE for significant arthralgias or myalgia.  Sore right elbow (no known injury)  NEURO:      NEGATIVE for weakness or paresthesias.  Occasional dizziness  ENDOCRINE:      NEGATIVE for temperature intolerance, skin/hair changes  PSYCHIATRIC:      NEGATIVE for changes in mood or affect.     EXAM:   /82   Pulse 60   Ht 1.6 m (5' 3\")   Wt 57.6 kg (127 lb)   BMI 22.50 kg/m     BMI: Body mass index is 22.5 kg/m .  Constitutional: healthy, alert and no distress  Head: Normocephalic. No masses, lesions, tenderness or abnormalities  Neck: Neck supple. Trachea midline. No adenopathy. Thyroid symmetric, normal size.   Cardiovascular: RRR.   Respiratory: lungs clear   Breast: Breasts reveal mild symmetric fibrocystic densities, but there are no dominant, discrete, fixed or suspicious masses found.  Gastrointestinal: Abdomen soft, non-tender, non-distended. No masses, organomegaly.  :  Vulva:  No external lesions, normal female hair distribution, no inguinal adenopathy.    Urethra:  Midline, non-tender, well supported, no discharge  Vagina:  Moist, pink, no abnormal discharge, no lesions  Cervix: clean   Uterus:  Normal size, non-tender, freely mobile  Ovaries:  No masses appreciated  Rectal Exam: deferred  Musculoskeletal: extremities normal  Skin: no suspicious lesions or rashes  Psychiatric: Affect appropriate, cooperative,mentation appears normal.     COUNSELING:   regular exercise  healthy diet/nutrition  Immunizations   contraception  family planning  Folic Acid Counseling   reports that she has never smoked. She has " never used smokeless tobacco.  Tobacco Cessation Action Plan: na  Body mass index is 22.5 kg/m .  Weight management plan: continue with healthy and an active life  FRAX Risk Assessment    ASSESSMENT:  39 year old female with satisfactory annual exam  Need of cervical cancer screening  Condoms  Sore right elbow  Occasional dizziness  PLAN:   Pap with High Risk hpv  CBC (hgb)  Folic acid 400 mcg PO daily  PT referral for right elbow pain    Return to office: 1 year for well woman care and as needed    Total visit greater than 30 minutes with 25 minutes spent discussing well woman care, health promotion, and disease prevention.    MARYANN Tsang, CNM

## 2020-01-30 LAB
COPATH REPORT: NORMAL
PAP: NORMAL

## 2020-03-02 NOTE — PROGRESS NOTES
Doing well.  Baby active.  Denies bleeding or LOF..  Reports some contractions.  US for EFW is scheduled for 1600 today.  Also check fluids.    Discussed:  Birth plan (turned in)                      Reviewed glucose log .  All FBS < 90.  Only 2 post meals > 120.    Dr. Estrada for baby doctor    RTO in one week or as needed.   Applied

## 2020-03-19 ENCOUNTER — TELEPHONE (OUTPATIENT)
Dept: FAMILY MEDICINE | Facility: OTHER | Age: 40
End: 2020-03-19

## 2020-03-19 NOTE — TELEPHONE ENCOUNTER
4:11 PM    Reason for Call: Phone Call    Description: pt has an est care in July but has been having some concerning symptoms that she would like to talk to you about..please call Thank You    Was an appointment offered for this call? No  If yes : Appointment type              Date    Preferred method for responding to this message: Telephone Call  What is your phone number ? [phone]    If we cannot reach you directly, may we leave a detailed response at the number you provided? Yes    Can this message wait until your PCP/provider returns, if available today? YES, No, provider is in    Rosana Hinkle

## 2020-03-19 NOTE — TELEPHONE ENCOUNTER
Pt states that Monday she drove over 4 hours and she thought on Tuesday that she felt something move in her leg.  Pt is not having pain in her shoulder.  Pt is concerned regarding blood clots.  She also states that she has had some joint pain and lightheadedness over the last few months.

## 2020-07-15 NOTE — PROGRESS NOTES
Subjective     Diana Chavis is a 39 year old female who presents to clinic today for the following health issues:    HPI       New Patient/Transfer of Care  Back Pain       Duration: intermittent for quite a while        Specific cause: none    Description:   Location of pain: low back bilateral  Character of pain: dull ache and intermittent  Pain radiation:none  New numbness or weakness in legs, not attributed to pain:  no     Intensity: mild    History:   Pain interferes with job: No  History of back problems: no prior back problems  Any previous MRI or X-rays: None  Sees a specialist for back pain:  No  Therapies tried without relief: cold and heat    Alleviating factors:   Improved by: time      Precipitating factors:  Worsened by: Lifting and Bending    Accompanying Signs & Symptoms:  Risk of Fracture:  None  Risk of Cauda Equina:  None  Risk of Infection:  None  Risk of Cancer:  None  Risk of Ankylosing Spondylitis:  Onset at age <35, male, AND morning back stiffness. no         Patient Active Problem List   Diagnosis     ACP (advance care planning)     Well woman exam with routine gynecological exam     Past Surgical History:   Procedure Laterality Date     EXTRACTION(S) DENTAL      [place] teeth extraction        Social History     Tobacco Use     Smoking status: Never Smoker     Smokeless tobacco: Never Used   Substance Use Topics     Alcohol use: Yes     Comment: social     Family History   Problem Relation Age of Onset     Hypertension Father      Asthma Father      Heart Disease Maternal Grandmother      Cerebrovascular Disease Maternal Grandmother      Diabetes Maternal Grandmother      Lung Cancer Paternal Grandfather      Tuberculosis Paternal Grandfather      Migraines Paternal Grandmother      Thyroid Disease Paternal Aunt      Down Syndrome Maternal Uncle      Diabetes Maternal Aunt      Migraines Maternal Aunt          Current Outpatient Medications   Medication Sig Dispense Refill     Multiple  "Vitamins-Minerals (MULTIVITAMIN ADULT PO)        No Known Allergies    Reviewed and updated as needed this visit by Provider  Tobacco  Allergies  Meds  Problems  Med Hx  Surg Hx  Fam Hx         Review of Systems   Constitutional, HEENT, cardiovascular, pulmonary, gi and gu systems are negative, except as otherwise noted.      Objective    /68 (BP Location: Left arm, Patient Position: Sitting, Cuff Size: Adult Regular)   Pulse 74   Temp 99  F (37.2  C) (Tympanic)   Resp 14   Ht 1.651 m (5' 5\")   Wt 58.8 kg (129 lb 8.6 oz)   SpO2 98%   BMI 21.56 kg/m    Body mass index is 21.56 kg/m .  Physical Exam   GENERAL: healthy, alert and no distress  BACK: no CVA tenderness, no paralumbar tenderness  PSYCH: mentation appears normal, affect normal/bright    Diagnostic Test Results:  none         Assessment & Plan     1. Bilateral low back pain without sciatica, unspecified chronicity  Patient would like to start exercising to help her back, but isn't sure what to do.  Will refer to PT for home program.  Follow-up if no improvement noted.  - PHYSICAL THERAPY REFERRAL; Future       Return if symptoms worsen or fail to improve.    Jena Galvez MD  [facility]  "

## 2020-07-20 ENCOUNTER — OFFICE VISIT (OUTPATIENT)
Dept: FAMILY MEDICINE | Facility: OTHER | Age: 40
End: 2020-07-20
Attending: FAMILY MEDICINE
Payer: COMMERCIAL

## 2020-07-20 VITALS
WEIGHT: 129.54 LBS | DIASTOLIC BLOOD PRESSURE: 68 MMHG | HEART RATE: 74 BPM | OXYGEN SATURATION: 98 % | BODY MASS INDEX: 21.58 KG/M2 | HEIGHT: 65 IN | RESPIRATION RATE: 14 BRPM | SYSTOLIC BLOOD PRESSURE: 112 MMHG | TEMPERATURE: 99 F

## 2020-07-20 DIAGNOSIS — M54.50 BILATERAL LOW BACK PAIN WITHOUT SCIATICA, UNSPECIFIED CHRONICITY: Primary | ICD-10-CM

## 2020-07-20 PROCEDURE — 99202 OFFICE O/P NEW SF 15 MIN: CPT | Performed by: FAMILY MEDICINE

## 2020-07-20 ASSESSMENT — PAIN SCALES - GENERAL: PAINLEVEL: NO PAIN (0)

## 2020-07-20 ASSESSMENT — MIFFLIN-ST. JEOR: SCORE: 1273.47

## 2020-07-20 NOTE — NURSING NOTE
"Chief Complaint   Patient presents with     Establish Care       Initial /68 (BP Location: Left arm, Patient Position: Sitting, Cuff Size: Adult Regular)   Pulse 74   Temp 99  F (37.2  C) (Tympanic)   Resp 14   Ht 1.651 m (5' 5\")   Wt 58.8 kg (129 lb 8.6 oz)   SpO2 98%   BMI 21.56 kg/m   Estimated body mass index is 21.56 kg/m  as calculated from the following:    Height as of this encounter: 1.651 m (5' 5\").    Weight as of this encounter: 58.8 kg (129 lb 8.6 oz).  Medication Reconciliation: complete  Tawanna Mason MA  "

## 2020-07-29 ENCOUNTER — HOSPITAL ENCOUNTER (OUTPATIENT)
Dept: PHYSICAL THERAPY | Facility: OTHER | Age: 40
End: 2020-07-29
Attending: FAMILY MEDICINE
Payer: COMMERCIAL

## 2020-07-29 DIAGNOSIS — M54.50 BILATERAL LOW BACK PAIN WITHOUT SCIATICA, UNSPECIFIED CHRONICITY: ICD-10-CM

## 2020-07-29 PROCEDURE — 97110 THERAPEUTIC EXERCISES: CPT | Mod: GP

## 2020-07-29 PROCEDURE — 97140 MANUAL THERAPY 1/> REGIONS: CPT | Mod: GP

## 2020-07-29 PROCEDURE — 97162 PT EVAL MOD COMPLEX 30 MIN: CPT | Mod: GP

## 2020-07-29 NOTE — PROGRESS NOTES
"   07/29/20 1425   General Information   Type of Visit Initial OP Ortho PT Evaluation   Start of Care Date 07/29/20   Referring Physician Dr. Galvez   Patient/Family Goals Statement learn some exercises for her back   Orders Evaluate and Treat   Date of Order 07/20/20   Certification Required? No   Medical Diagnosis B LBP   Surgical/Medical history reviewed Yes   Precautions/Limitations no known precautions/limitations   General Information Comments PMH - see chart   Body Part(s)   Body Part(s) Lumbar Spine/SI   Presentation and Etiology   Pertinent history of current problem (include personal factors and/or comorbidities that impact the POC) Patient reports in January of this year she recalls waking with some low back pain.  She states this pain lasted a few days.  She will noted at times some intermittent back pain/strain when \"overdoing it \".  She does not have any present low back pain.  She would like to learn some exercises for her back.  She is not doing any present home stretching etc.  She has recently begun doing some running.  She also still reports some history of left shoulder pain and possibly decreased mobility.   Impairments A. Pain   Functional Limitations perform activities of daily living;perform desired leisure / sports activities   Symptom Location No present pain however when having pain in the past she has noted it along the central and bilateral low back/lumbosacral   How/Where did it occur From insidious onset   Chronicity Recurrent   Pain rating (0-10 point scale) Best (/10);Worst (/10)   Best (/10) 0   Worst (/10) 0   Pain quality B. Dull   Frequency of pain/symptoms B. Intermittent   Pain/symptoms are: The same all the time   Pain/symptoms exacerbated by I. Bending;D. Carrying;G. Certain positions;J. ADL;K. Home tasks;M. Other   Pain exacerbation comment Carrying feed her animals, lifting carrying child, when going from sit to stand at times she notes it difficult to fully straighten " her back   Pain/symptoms eased by C. Rest;K. Other   Pain eased by comment She has not tried any home modalities   Progression of symptoms since onset: Worsened   Current Level of Function   Patient role/employment history C. Homemaker   Fall Risk Screen   Fall screen completed by PT   Have you fallen 2 or more times in the past year? No   Have you fallen and had an injury in the past year? No   Is patient a fall risk? No   System Outcome Measures   Outcome Measures Low Back Pain (see Oswestry and Bernabe)   Lumbar Spine/SI Objective Findings   Observation No acute distress   Posture Forward head rounded shoulder posture with increased lumbar lordosis   Gait/Locomotion Nonantalgic   Flexion ROM Able to touch fingertips to floor without pain   Extension ROM 0-25   Right Side Bending ROM 0-27   Left Side Bending ROM 0-29   Pelvic Screen Negative   Hip Screen Negative   Transversus Abdominus Strength (Wyatt Leg Lowering-deg) She was able to stand minimal to moderate resistance with her abdominals, and moderate resistance with her trunk extensors   Hip Flexion (L2) Strength 5/5   Hip Abduction Strength  bilaterally   Hip Extension Strength 5/5 bilaterally   Lumbar/Hip/Knee/Foot Strength Comments Hip extension strength: 4/5 bilaterally   Hamstring Flexibility Mild tightness bilaterally   Hip Flexor Flexibility Mild tightness bilaterally   Quadricep Flexibility Good   Piriformis Flexibility Good   SLR Negative   Prone Instability Test Negative   Crossover SLR Negative   Segmental Mobility FRS right L3   Palpation Mild tenderness and soft tissue tension along the left lumbar paraspinal muscles and along the left quadratus lumborum muscle   Planned Therapy Interventions   Planned Therapy Interventions manual therapy;ROM;strengthening;stretching   Planned Modality Interventions   Planned Modality Interventions Comments Modalities as indicated   Clinical Impression   Criteria for Skilled Therapeutic Interventions Met yes,  treatment indicated   PT Diagnosis Bilateral low back pain   Influenced by the following impairments Pain, decreased mobility, decreased strength   Functional limitations due to impairments Lifting, carrying, bending, going from sit to stand, household tasks   Clinical Presentation Evolving/Changing   Clinical Presentation Rationale Frequency of episodes has increased with functional limitations   Clinical Decision Making (Complexity) Moderate complexity   Therapy Frequency 2 times/Week   Predicted Duration of Therapy Intervention (days/wks) 4 weeks   Risk & Benefits of therapy have been explained Yes   Patient, Family & other staff in agreement with plan of care Yes   Clinical Impression Comments Patient presents with mild mechanical dysfunctions in the low back and soft tissue restrictions.  She is also noted to have decreased core strength   Education Assessment   Barriers to Learning No barriers   ORTHO GOALS   PT Ortho Eval Goals 1;2;3   Ortho Goal 1   Goal Identifier STG 1   Goal Description Patient to begin home stretching program   Target Date 08/05/20   Ortho Goal 2   Goal Identifier LTG1   Goal Description Patient to demonstrate independence with home exercise program   Target Date 08/26/20   Ortho Goal 3   Goal Identifier LTG 2   Goal Description Patient will be able to go from sit to stand without discomfort or limited ability to straighten her back 75% of the time   Target Date 08/26/20   Total Evaluation Time   PT Josey Moderate Complexity Minutes (19837) 30

## 2020-07-31 ENCOUNTER — TELEPHONE (OUTPATIENT)
Dept: FAMILY MEDICINE | Facility: OTHER | Age: 40
End: 2020-07-31

## 2020-07-31 NOTE — TELEPHONE ENCOUNTER
1:06 PM    Reason for Call: Phone Call    Description: talk you about a mole that is concerning to pt    Was an appointment offered for this call? Yes  If yes : Appointment type              Date 08/17/2020    Preferred method for responding to this message: Telephone Call  What is your phone number ? [phone]    If we cannot reach you directly, may we leave a detailed response at the number you provided? Yes    Can this message wait until your PCP/provider returns, if available today? YES, No    Rosana Hinkle

## 2020-08-07 VITALS
TEMPERATURE: 98.9 F | SYSTOLIC BLOOD PRESSURE: 116 MMHG | DIASTOLIC BLOOD PRESSURE: 68 MMHG | WEIGHT: 129 LBS | OXYGEN SATURATION: 99 % | HEIGHT: 65 IN | HEART RATE: 71 BPM | RESPIRATION RATE: 16 BRPM | BODY MASS INDEX: 21.49 KG/M2

## 2020-08-07 ASSESSMENT — PAIN SCALES - GENERAL: PAINLEVEL: NO PAIN (0)

## 2020-08-07 ASSESSMENT — MIFFLIN-ST. JEOR: SCORE: 1271.02

## 2020-08-07 NOTE — NURSING NOTE
"Chief Complaint   Patient presents with     Derm Problem       Initial /68 (BP Location: Right arm, Patient Position: Sitting, Cuff Size: Adult Regular)   Pulse 71   Temp 98.9  F (37.2  C) (Tympanic)   Resp 16   Ht 1.651 m (5' 5\")   Wt 58.5 kg (129 lb)   SpO2 99%   BMI 21.47 kg/m   Estimated body mass index is 21.47 kg/m  as calculated from the following:    Height as of this encounter: 1.651 m (5' 5\").    Weight as of this encounter: 58.5 kg (129 lb).  Medication Reconciliation: complete  Tawanna Mason MA  "

## 2020-08-13 ENCOUNTER — OFFICE VISIT (OUTPATIENT)
Dept: FAMILY MEDICINE | Facility: OTHER | Age: 40
End: 2020-08-13
Attending: FAMILY MEDICINE
Payer: COMMERCIAL

## 2020-08-13 VITALS
WEIGHT: 129 LBS | OXYGEN SATURATION: 98 % | RESPIRATION RATE: 16 BRPM | DIASTOLIC BLOOD PRESSURE: 72 MMHG | SYSTOLIC BLOOD PRESSURE: 116 MMHG | HEART RATE: 67 BPM | BODY MASS INDEX: 21.49 KG/M2 | HEIGHT: 65 IN | TEMPERATURE: 98.1 F

## 2020-08-13 DIAGNOSIS — D22.9 CHANGE IN SKIN MOLE: Primary | ICD-10-CM

## 2020-08-13 DIAGNOSIS — Z53.9 ERRONEOUS ENCOUNTER--DISREGARD: Primary | ICD-10-CM

## 2020-08-13 PROCEDURE — 88305 TISSUE EXAM BY PATHOLOGIST: CPT | Mod: TC | Performed by: FAMILY MEDICINE

## 2020-08-13 PROCEDURE — 11401 EXC TR-EXT B9+MARG 0.6-1 CM: CPT | Performed by: FAMILY MEDICINE

## 2020-08-13 ASSESSMENT — MIFFLIN-ST. JEOR: SCORE: 1271.02

## 2020-08-13 NOTE — PROGRESS NOTES
"Subjective     Diana Chavis is a 39 year old female who presents to clinic today for the following health issues:    HPI       Mole removal      Duration: Several years    Description (location/character/radiation): Left upper chest    Intensity:  mild    Accompanying signs and symptoms: change in shape and color    History (similar episodes/previous evaluation): None    Precipitating or alleviating factors: None    Therapies tried and outcome: None     {additonal problems for provider to add (Optional):615375}    {HIST REVIEW/ LINKS 2 (Optional):373092}    Reviewed and updated as needed this visit by Provider         Review of Systems   {ROS COMP (Optional):982917}      Objective    There were no vitals taken for this visit.  There is no height or weight on file to calculate BMI.  Physical Exam   {Exam List (Optional):083206}    {Diagnostic Test Results (Optional):603922::\"Diagnostic Test Results:\",\"Labs reviewed in Epic\"}        {PROVIDER CHARTING PREFERENCE:363001}  "

## 2020-08-13 NOTE — PROGRESS NOTES
Subjective: Diana Chavis a 39 year old female who presents today for lesion removal. The lesion is located on the left upper chest,  and measures 4cm.  She denies other significant symptoms on ROS. Medications reviewed.    Objective: The area was prepped and appropriately anesthetized. Using the usual technique, full thickness elliptical excision in total was performed. The total area excised, including lesion and margins was 1.2 cm. 4 interrupted sutures of 4-0 ethilon were used to close the incision without difficulty.  An appropriate  dressing was applied.  The procedure was well tolerated and without complications. Specimen was sent.    Assessment: changing mole    Plan: Follow up: The specimen is labelled and sent to pathology for evaluation., Return for suture removal in 7 days. Wound care instructions provided.  Patient was instructed to be alert for any signs of cutaneous infection.      Jena Galvez MD        Wound Care Instructions    1.  Keep area dry today.    2.  Starting tomorrow wash gently with soap and water once daily.      3.  Apply Vaseline or antibiotic ointment to the area and cover with a bandage if desired.  Do not let it dry out and form a scab as this will make the resulting scar more noticeable.    4. Protect the area from sun for up to one year afterward as the scar is continuing to remodel.  Sun exposure will also make the resulting scar more noticeable.    5.  Call if the area is very red, tender, has a discharge or is very itchy while healing, or if you have any other questions.  These may be signs of early infection or allergy.

## 2020-08-13 NOTE — NURSING NOTE
"Chief Complaint   Patient presents with     Derm Problem       Initial /72 (BP Location: Right arm, Patient Position: Sitting, Cuff Size: Adult Regular)   Pulse 67   Temp 98.1  F (36.7  C) (Tympanic)   Resp 16   Ht 1.651 m (5' 5\")   Wt 58.5 kg (129 lb)   SpO2 98%   BMI 21.47 kg/m   Estimated body mass index is 21.47 kg/m  as calculated from the following:    Height as of this encounter: 1.651 m (5' 5\").    Weight as of this encounter: 58.5 kg (129 lb).  Medication Reconciliation: complete  Tawanna Mason MA  "

## 2020-08-14 LAB — COPATH REPORT: NORMAL

## 2020-08-20 ENCOUNTER — ALLIED HEALTH/NURSE VISIT (OUTPATIENT)
Dept: FAMILY MEDICINE | Facility: OTHER | Age: 40
End: 2020-08-20
Attending: FAMILY MEDICINE
Payer: COMMERCIAL

## 2020-08-20 DIAGNOSIS — Z48.02 ENCOUNTER FOR REMOVAL OF SUTURES: Primary | ICD-10-CM

## 2020-08-20 NOTE — PROGRESS NOTES
Suture removal:     Date sutures applied: 8/13/2020         Where (setting) in which they applied:[facility]    Description:  Type: sutures  Location: left shoulder    History:    Cause of laceration: Lesion Removal    Accompanying Signs & Symptoms: (staff: if yes-describe)  Redness: YES- notified provider  Warmth: no  Drainage: no  Still bleeding: no  Fevers: no    Last tetanus shot: last tetanus booster within 10 years        Diana Chavis presents to the clinic for removal of sutures. The patient has had sutures in place for 7 days. There has been no patient reported signs or symptoms of infection or drainage. 4  sutures are seen and located on the left shoulder. Tetanus status is up to date. All sutures were easily removed today. Routine wound care discussed by the LPN or provider. The patient will follow up as needed.    Ashley LeChevalier LPN

## 2020-09-04 NOTE — PROGRESS NOTES
Outpatient Physical Therapy Discharge Note     Patient: Diana Chavis  : 1980    Beginning/End Dates of Reporting Period:  2020 to 2020    Referring Provider: Dr. Galvez    Therapy Diagnosis: B LBP     Client Self Report: PT evaluation completed -see evaluation report    Objective Measurements:                                          Outcome Measures (most recent score):      Goals:  Goal Identifier STG 1   Goal Description Patient to begin home stretching program   Target Date 20   Date Met      Progress:     Goal Identifier LTG1   Goal Description Patient to demonstrate independence with home exercise program   Target Date 20   Date Met      Progress:     Goal Identifier LTG 2   Goal Description Patient will be able to go from sit to stand without discomfort or limited ability to straighten her back 75% of the time   Target Date 20   Date Met      Progress:     Goal Identifier     Goal Description     Target Date     Date Met      Progress:     Goal Identifier     Goal Description     Target Date     Date Met      Progress:     Goal Identifier     Goal Description     Target Date     Date Met      Progress:     Goal Identifier     Goal Description     Target Date     Date Met      Progress:     Goal Identifier     Goal Description     Target Date     Date Met      Progress:     Progress Toward Goals:   Not assessed this period. She was seen for one time eval and treatment and was recommended to schedule more,but did not schedule any f/u's in PT          Plan:  Discharge from therapy.    Discharge:    Reason for Discharge: Patient has failed to schedule further appointments.    Equipment Issued: none    Discharge Plan: Patient to continue home program.

## 2021-01-27 ENCOUNTER — OFFICE VISIT (OUTPATIENT)
Dept: OBGYN | Facility: OTHER | Age: 41
End: 2021-01-27
Attending: ADVANCED PRACTICE MIDWIFE
Payer: COMMERCIAL

## 2021-01-27 VITALS
SYSTOLIC BLOOD PRESSURE: 105 MMHG | WEIGHT: 120 LBS | HEIGHT: 65 IN | BODY MASS INDEX: 19.99 KG/M2 | DIASTOLIC BLOOD PRESSURE: 64 MMHG

## 2021-01-27 DIAGNOSIS — N63.11 LUMP IN UPPER OUTER QUADRANT OF RIGHT BREAST: ICD-10-CM

## 2021-01-27 DIAGNOSIS — Z01.419 WELL WOMAN EXAM WITH ROUTINE GYNECOLOGICAL EXAM: Primary | ICD-10-CM

## 2021-01-27 DIAGNOSIS — Z12.4 ENCOUNTER FOR SCREENING FOR CERVICAL CANCER: ICD-10-CM

## 2021-01-27 PROCEDURE — 99395 PREV VISIT EST AGE 18-39: CPT | Performed by: ADVANCED PRACTICE MIDWIFE

## 2021-01-27 PROCEDURE — G0123 SCREEN CERV/VAG THIN LAYER: HCPCS | Performed by: ADVANCED PRACTICE MIDWIFE

## 2021-01-27 PROCEDURE — 87624 HPV HI-RISK TYP POOLED RSLT: CPT | Performed by: ADVANCED PRACTICE MIDWIFE

## 2021-01-27 ASSESSMENT — PAIN SCALES - GENERAL: PAINLEVEL: NO PAIN (0)

## 2021-01-27 ASSESSMENT — ANXIETY QUESTIONNAIRES
7. FEELING AFRAID AS IF SOMETHING AWFUL MIGHT HAPPEN: NOT AT ALL
5. BEING SO RESTLESS THAT IT IS HARD TO SIT STILL: NOT AT ALL
1. FEELING NERVOUS, ANXIOUS, OR ON EDGE: NOT AT ALL
6. BECOMING EASILY ANNOYED OR IRRITABLE: NOT AT ALL
4. TROUBLE RELAXING: NOT AT ALL
2. NOT BEING ABLE TO STOP OR CONTROL WORRYING: NOT AT ALL
GAD7 TOTAL SCORE: 0
3. WORRYING TOO MUCH ABOUT DIFFERENT THINGS: NOT AT ALL

## 2021-01-27 ASSESSMENT — MIFFLIN-ST. JEOR: SCORE: 1225.2

## 2021-01-27 ASSESSMENT — PATIENT HEALTH QUESTIONNAIRE - PHQ9: SUM OF ALL RESPONSES TO PHQ QUESTIONS 1-9: 0

## 2021-01-27 NOTE — NURSING NOTE
"Chief Complaint   Patient presents with     Gyn Exam       Initial /64 (BP Location: Left arm, Patient Position: Chair, Cuff Size: Adult Regular)   Ht 1.651 m (5' 5\")   Wt 54.4 kg (120 lb)   BMI 19.97 kg/m   Estimated body mass index is 19.97 kg/m  as calculated from the following:    Height as of this encounter: 1.651 m (5' 5\").    Weight as of this encounter: 54.4 kg (120 lb).  Medication Reconciliation: complete  Sary Lloyd LPN    "

## 2021-01-27 NOTE — PROGRESS NOTES
ANNUAL    Diana is a 40 year old  female who presents for annual exam.   Youngest child 3  Largest Child 8 lb 10 oz  GDM y diet controlled  Hypertension n  No LMP recorded.  Menses:  Cycles 28 days When did they start 15  How many days bleed 4 days with 1-2 heavy pain occasional cramping Contraception condoms.  Planning pregnancy: n  Concerns in addition to routine health maintenance?  Possible right breast lump.  GYNECOLOGIC HISTORY:   Surgery: n  History sexually transmitted infections:No STD history   Safe?  y  STI testing offered?  y  History of abnormal Pap smear: n  Problems with sex? (bleeding/pain) n  Family history of: breast cancer: n   Uterine cancer: n ovarian cancer: n   colon cancer: n    HEALTH MAINTENANCE:  Cholesterol: (No results found for: CHOL History of abnormal lipids: n  Mammo: n . History of abnormal Mammo: na   Regular Self Breast Exams: y Calcium/Vitamin D or Vitamin: y Exercise Active lifestyle    TSH: (No results found for: TSH )  Pap; (  Lab Results   Component Value Date    PAP NIL 2020    PAP NIL 2018    PAP NIL 2017    )    HISTORY:  OB History    Para Term  AB Living   5 4 4 0 1 4   SAB TAB Ectopic Multiple Live Births   1 0 0 0 4      # Outcome Date GA Lbr Leonid/2nd Weight Sex Delivery Anes PTL Lv   5 Term 10/05/17 40w3d 09:44 / 00:02 3.286 kg (7 lb 3.9 oz) F Vag-Spont None N JEREMY      Name: LELO FLOREZ      Apgar1: 8  Apgar5: 9   4 SAB 16 12w2d          3 Term 01/04/15 39w4d  3.969 kg (8 lb 12 oz) M Vag-Spont   JEREMY      Name: Trevin   2 Term 12 39w5d  3.459 kg (7 lb 10 oz) F Vag-Spont   JEREMY      Name: Darling   1 Term 12/31/10 40w0d  3.118 kg (6 lb 14 oz) F Vag-Spont   JEREMY      Name: Sowmya     No past medical history on file.  Past Surgical History:   Procedure Laterality Date     EXTRACTION(S) DENTAL      [place] teeth extraction      Family History   Problem Relation Age of Onset     Hypertension Father      Asthma  Father      Heart Disease Maternal Grandmother      Cerebrovascular Disease Maternal Grandmother      Diabetes Maternal Grandmother      Lung Cancer Paternal Grandfather      Tuberculosis Paternal Grandfather      Migraines Paternal Grandmother      Thyroid Disease Paternal Aunt      Down Syndrome Maternal Uncle      Diabetes Maternal Aunt      Migraines Maternal Aunt      Social History     Socioeconomic History     Marital status:      Spouse name: None     Number of children: None     Years of education: None     Highest education level: None   Occupational History     None   Social Needs     Financial resource strain: None     Food insecurity     Worry: None     Inability: None     Transportation needs     Medical: None     Non-medical: None   Tobacco Use     Smoking status: Never Smoker     Smokeless tobacco: Never Used   Substance and Sexual Activity     Alcohol use: Yes     Comment: social     Drug use: Never     Sexual activity: Yes     Partners: Male     Birth control/protection: None   Lifestyle     Physical activity     Days per week: None     Minutes per session: None     Stress: None   Relationships     Social connections     Talks on phone: None     Gets together: None     Attends Evangelical service: None     Active member of club or organization: None     Attends meetings of clubs or organizations: None     Relationship status: None     Intimate partner violence     Fear of current or ex partner: None     Emotionally abused: None     Physically abused: None     Forced sexual activity: None   Other Topics Concern     Parent/sibling w/ CABG, MI or angioplasty before 65F 55M? Not Asked   Social History Narrative     None       Current Outpatient Medications:      Multiple Vitamins-Minerals (MULTIVITAMIN ADULT PO), , Disp: , Rfl:    No Known Allergies    Past medical, surgical, social and family history were reviewed and updated in [facility].    ROS:    CONSTITUTIONAL:     NEGATIVE for fever, chills,  "change in weight  INTEGUMENTARY/SKIN:       NEGATIVE for worrisome rashes, moles or lesions  EYES:     NEGATIVE for vision changes or irritation  ENT/MOUTH: NEGATIVE for ear, mouth and throat problems  RESP:     NEGATIVE for significant cough or SOB  CV:   NEGATIVE for chest pain, palpitations or peripheral edema  GI:     NEGATIVE for nausea, abdominal pain, heartburn, or change in bowel habits  :   NEGATIVE for frequency, dysuria, hematuria, vaginal discharge, or irregular bleeding,incontinence   MUSCULOSKELETAL:     NEGATIVE for significant arthralgias or myalgia  NEURO:      NEGATIVE for weakness, dizziness or paresthesias  ENDOCRINE:      NEGATIVE for temperature intolerance, skin/hair changes  PSYCHIATRIC:      NEGATIVE for changes in mood or affect.     EXAM:   /64 (BP Location: Left arm, Patient Position: Chair, Cuff Size: Adult Regular)   Ht 1.651 m (5' 5\")   Wt 54.4 kg (120 lb)   BMI 19.97 kg/m     BMI: Body mass index is 19.97 kg/m .  Constitutional: healthy, alert and no distress  Head: Normocephalic. No masses, lesions, tenderness or abnormalities  Neck: Neck supple. Trachea midline. No adenopathy. Thyroid symmetric, normal size.   Cardiovascular: RRR.   Respiratory: lungs clear   Breast:  Lump noted on outer quadrant of right breat at 9 o'clock, 6 cm from nipple, approximately 1 cm in size.  Breasts reveal mild symmetric fibrocystic densities, Gastrointestinal: Abdomen soft, non-tender, non-distended. No masses, organomegaly.  :  Vulva:  No external lesions, normal female hair distribution, no inguinal adenopathy.    Urethra:  Midline, non-tender, well supported, no discharge  Vagina:  Moist, pink, no abnormal discharge, no lesions  Cervix: clean   Uterus:  Normal size, non-tender, freely mobile  Ovaries:  No masses appreciated  Rectal Exam: deferred  Musculoskeletal: extremities normal  Skin: no suspicious lesions or rashes  Psychiatric: Affect appropriate, cooperative,mentation appears " normal.     COUNSELING:   regular exercise  healthy diet/nutrition  Immunizations   contraception  family planning  Folic Acid Counseling   reports that she has never smoked. She has never used smokeless tobacco.  Tobacco Cessation Action Plan: na  Body mass index is 19.97 kg/m .  Weight management plan: Healthy eating and active lifestyle  FRAX Risk Assessment    ASSESSMENT:  40 year old female with satisfactory annual exam  Need of cervical cancer screening  Family planning:  Condoms  Reports lump on right breast  Declines flu shot with education   PLAN:   Pap with High Risk hpv   US for right breast r/t lump       Return to office: 1 year for well woman care and as needed    30 minutes spent on the date of the encounter doing chart review, history and exam, documentation and further activities as noted above    MARYANN Tsang, CNM

## 2021-01-27 NOTE — PATIENT INSTRUCTIONS
Return to office in one year for well woman care and as needed.    Thank you for allowing Marcelo WOLF CNM and our OB team to participate in your care.  If you have a scheduling or an appointment question please contact [facility] Coordinator at their direct line [phone].   ALL nursing questions or concerns can be directed to your OB nurse at: [phone] Sabiha Okeefe/Wilma

## 2021-01-28 ASSESSMENT — ANXIETY QUESTIONNAIRES: GAD7 TOTAL SCORE: 0

## 2021-02-01 ENCOUNTER — HOSPITAL ENCOUNTER (OUTPATIENT)
Dept: ULTRASOUND IMAGING | Facility: HOSPITAL | Age: 41
End: 2021-02-01
Attending: ADVANCED PRACTICE MIDWIFE
Payer: COMMERCIAL

## 2021-02-01 ENCOUNTER — ANCILLARY PROCEDURE (OUTPATIENT)
Dept: MAMMOGRAPHY | Facility: OTHER | Age: 41
End: 2021-02-01
Attending: ADVANCED PRACTICE MIDWIFE
Payer: COMMERCIAL

## 2021-02-01 DIAGNOSIS — N63.11 LUMP IN UPPER OUTER QUADRANT OF RIGHT BREAST: ICD-10-CM

## 2021-02-01 LAB
COPATH REPORT: NORMAL
PAP: NORMAL

## 2021-02-01 PROCEDURE — 77066 DX MAMMO INCL CAD BI: CPT | Mod: TC | Performed by: RADIOLOGY

## 2021-02-01 PROCEDURE — G0279 TOMOSYNTHESIS, MAMMO: HCPCS | Mod: TC | Performed by: RADIOLOGY

## 2021-02-01 PROCEDURE — 76642 ULTRASOUND BREAST LIMITED: CPT | Mod: RT

## 2021-04-21 ENCOUNTER — TELEPHONE (OUTPATIENT)
Dept: FAMILY MEDICINE | Facility: OTHER | Age: 41
End: 2021-04-21

## 2024-07-16 ENCOUNTER — TELEPHONE (OUTPATIENT)
Dept: FAMILY MEDICINE | Facility: OTHER | Age: 44
End: 2024-07-16

## 2024-07-16 NOTE — TELEPHONE ENCOUNTER
3:35 PM    Reason for Call: Phone Call    Description: Patient called requesting to know when and where last tetanus shot was done. Please reach out to patient to discuss.      Was an appointment offered for this call? No  If yes : Appointment type              Date    Preferred method for responding to this message: Telephone Call  What is your phone number ?[phone]     If we cannot reach you directly, may we leave a detailed response at the number you provided? Yes    Can this message wait until your PCP/provider returns, if available today? Not applicable    Madhuri Mark

## 2025-02-11 ENCOUNTER — ALLIED HEALTH/NURSE VISIT (OUTPATIENT)
Dept: FAMILY MEDICINE | Facility: OTHER | Age: 45
End: 2025-02-11
Attending: FAMILY MEDICINE
Payer: COMMERCIAL

## 2025-02-11 DIAGNOSIS — Z23 NEED FOR VACCINATION: Primary | ICD-10-CM

## 2025-02-11 PROCEDURE — 90471 IMMUNIZATION ADMIN: CPT

## 2025-02-11 NOTE — PROGRESS NOTES
Prior to immunization administration, verified patients identity using patient s name and date of birth. Please see Immunization Activity for additional information.     Is the patient's temperature normal (100.5 or less)? Yes     Patient MEETS CRITERIA. PROCEED with vaccine administration.        2/11/2025   General Questionnaire    Do you have any questions for your care team about the vaccines you will be receiving today? no         Patient instructed to remain in clinic for 15 minutes afterwards, and to report any adverse reactions.      Link to Ancillary Visit Immunization Standing Orders SmartSet     Screening performed by Mira Augustin RN on 2/11/2025 at 2:39 PM.